# Patient Record
Sex: FEMALE | Race: WHITE | Employment: UNEMPLOYED | URBAN - NONMETROPOLITAN AREA
[De-identification: names, ages, dates, MRNs, and addresses within clinical notes are randomized per-mention and may not be internally consistent; named-entity substitution may affect disease eponyms.]

---

## 2021-06-10 PROBLEM — J69.0 ASPIRATION PNEUMONIA DUE TO REGURGITATED FOOD (HCC): Status: ACTIVE | Noted: 2021-06-10

## 2021-06-10 PROBLEM — J18.9 PNEUMONIA: Status: ACTIVE | Noted: 2021-06-10

## 2021-06-11 ENCOUNTER — HOSPITAL ENCOUNTER (INPATIENT)
Age: 36
LOS: 4 days | Discharge: HOME | DRG: 139 | End: 2021-06-15
Attending: INTERNAL MEDICINE
Payer: MEDICAID

## 2021-06-11 ENCOUNTER — APPOINTMENT (OUTPATIENT)
Dept: CT IMAGING | Age: 36
DRG: 139 | End: 2021-06-11
Attending: INTERNAL MEDICINE
Payer: MEDICAID

## 2021-06-11 ENCOUNTER — APPOINTMENT (OUTPATIENT)
Dept: GENERAL RADIOLOGY | Age: 36
DRG: 139 | End: 2021-06-11
Attending: INTERNAL MEDICINE
Payer: MEDICAID

## 2021-06-11 PROBLEM — E66.01 SEVERE OBESITY (BMI 35.0-35.9 WITH COMORBIDITY) (HCC): Status: ACTIVE | Noted: 2021-06-11

## 2021-06-11 PROBLEM — I50.32 CHRONIC HEART FAILURE WITH PRESERVED EJECTION FRACTION (HCC): Status: ACTIVE | Noted: 2021-06-11

## 2021-06-11 PROBLEM — R09.02 HYPOXEMIA: Status: ACTIVE | Noted: 2021-06-11

## 2021-06-11 PROBLEM — G93.2 IDIOPATHIC INTRACRANIAL HYPERTENSION: Status: ACTIVE | Noted: 2021-06-11

## 2021-06-11 PROBLEM — J96.00 ACUTE RESPIRATORY FAILURE (HCC): Status: ACTIVE | Noted: 2021-06-11

## 2021-06-11 PROBLEM — G40.909 SEIZURE DISORDER (HCC): Status: ACTIVE | Noted: 2021-06-11

## 2021-06-11 LAB
ALBUMIN SERPL-MCNC: 4.1 G/DL (ref 3.5–5.1)
ALLEN TEST: POSITIVE
ALP BLD-CCNC: 108 U/L (ref 38–126)
ALT SERPL-CCNC: 12 U/L (ref 11–66)
ANION GAP SERPL CALCULATED.3IONS-SCNC: 14 MEQ/L (ref 8–16)
AST SERPL-CCNC: 44 U/L (ref 5–40)
BASE EXCESS (CALCULATED): 2.8 MMOL/L (ref -2.5–2.5)
BASOPHILS # BLD: 0.2 %
BASOPHILS ABSOLUTE: 0.1 THOU/MM3 (ref 0–0.1)
BILIRUB SERPL-MCNC: 0.2 MG/DL (ref 0.3–1.2)
BUN BLDV-MCNC: 20 MG/DL (ref 7–22)
CALCIUM IONIZED: 1 MMOL/L (ref 1.12–1.32)
CALCIUM SERPL-MCNC: 9.9 MG/DL (ref 8.5–10.5)
CHLORIDE BLD-SCNC: 101 MEQ/L (ref 98–111)
CO2: 24 MEQ/L (ref 23–33)
COLLECTED BY:: ABNORMAL
CREAT SERPL-MCNC: 0.8 MG/DL (ref 0.4–1.2)
DEVICE: ABNORMAL
EOSINOPHIL # BLD: 1.2 %
EOSINOPHILS ABSOLUTE: 0.3 THOU/MM3 (ref 0–0.4)
ERYTHROCYTE [DISTWIDTH] IN BLOOD BY AUTOMATED COUNT: 13.2 % (ref 11.5–14.5)
ERYTHROCYTE [DISTWIDTH] IN BLOOD BY AUTOMATED COUNT: 48.1 FL (ref 35–45)
GFR SERPL CREATININE-BSD FRML MDRD: 81 ML/MIN/1.73M2
GLUCOSE BLD-MCNC: 99 MG/DL (ref 70–108)
HCO3: 30 MMOL/L (ref 23–28)
HCT VFR BLD CALC: 41.8 % (ref 37–47)
HEMOGLOBIN: 12.7 GM/DL (ref 12–16)
IFIO2: 100
IMMATURE GRANS (ABS): 0.16 THOU/MM3 (ref 0–0.07)
IMMATURE GRANULOCYTES: 0.6 %
LACTIC ACID: 0.6 MMOL/L (ref 0.5–2)
LYMPHOCYTES # BLD: 3.7 %
LYMPHOCYTES ABSOLUTE: 1 THOU/MM3 (ref 1–4.8)
MAGNESIUM: 2 MG/DL (ref 1.6–2.4)
MCH RBC QN AUTO: 30.1 PG (ref 26–33)
MCHC RBC AUTO-ENTMCNC: 30.4 GM/DL (ref 32.2–35.5)
MCV RBC AUTO: 99.1 FL (ref 81–99)
MODE: ABNORMAL
MONOCYTES # BLD: 1.4 %
MONOCYTES ABSOLUTE: 0.4 THOU/MM3 (ref 0.4–1.3)
NUCLEATED RED BLOOD CELLS: 0 /100 WBC
O2 SATURATION: 98 %
PCO2: 55 MMHG (ref 35–45)
PH BLOOD GAS: 7.34 (ref 7.35–7.45)
PHOSPHORUS: 3.3 MG/DL (ref 2.4–4.7)
PLATELET # BLD: 304 THOU/MM3 (ref 130–400)
PLATELET ESTIMATE: ADEQUATE
PMV BLD AUTO: 10.4 FL (ref 9.4–12.4)
PO2: 121 MMHG (ref 71–104)
POTASSIUM SERPL-SCNC: 4.6 MEQ/L (ref 3.5–5.2)
PRO-BNP: 264.5 PG/ML (ref 0–450)
PROCALCITONIN: 0.1 NG/ML (ref 0.01–0.09)
RBC # BLD: 4.22 MILL/MM3 (ref 4.2–5.4)
SCAN OF BLOOD SMEAR: NORMAL
SEG NEUTROPHILS: 92.9 %
SEGMENTED NEUTROPHILS ABSOLUTE COUNT: 24.1 THOU/MM3 (ref 1.8–7.7)
SET PEEP: 6 MMHG
SET PRESS SUPP: 6 CMH2O
SODIUM BLD-SCNC: 139 MEQ/L (ref 135–145)
SOURCE, BLOOD GAS: ABNORMAL
TOTAL PROTEIN: 7.5 G/DL (ref 6.1–8)
TROPONIN T: < 0.01 NG/ML
WBC # BLD: 25.9 THOU/MM3 (ref 4.8–10.8)

## 2021-06-11 PROCEDURE — 85025 COMPLETE CBC W/AUTO DIFF WBC: CPT

## 2021-06-11 PROCEDURE — 83880 ASSAY OF NATRIURETIC PEPTIDE: CPT

## 2021-06-11 PROCEDURE — 94761 N-INVAS EAR/PLS OXIMETRY MLT: CPT

## 2021-06-11 PROCEDURE — 3209999900 CT INTERPRETATION OF OUTSIDE IMAGES

## 2021-06-11 PROCEDURE — 2060000000 HC ICU INTERMEDIATE R&B

## 2021-06-11 PROCEDURE — 84100 ASSAY OF PHOSPHORUS: CPT

## 2021-06-11 PROCEDURE — 82803 BLOOD GASES ANY COMBINATION: CPT

## 2021-06-11 PROCEDURE — 83735 ASSAY OF MAGNESIUM: CPT

## 2021-06-11 PROCEDURE — 71045 X-RAY EXAM CHEST 1 VIEW: CPT

## 2021-06-11 PROCEDURE — 84145 PROCALCITONIN (PCT): CPT

## 2021-06-11 PROCEDURE — 94660 CPAP INITIATION&MGMT: CPT

## 2021-06-11 PROCEDURE — 80053 COMPREHEN METABOLIC PANEL: CPT

## 2021-06-11 PROCEDURE — 99223 1ST HOSP IP/OBS HIGH 75: CPT | Performed by: INTERNAL MEDICINE

## 2021-06-11 PROCEDURE — 2700000000 HC OXYGEN THERAPY PER DAY

## 2021-06-11 PROCEDURE — 82330 ASSAY OF CALCIUM: CPT

## 2021-06-11 PROCEDURE — 84484 ASSAY OF TROPONIN QUANT: CPT

## 2021-06-11 PROCEDURE — 83605 ASSAY OF LACTIC ACID: CPT

## 2021-06-11 PROCEDURE — 36415 COLL VENOUS BLD VENIPUNCTURE: CPT

## 2021-06-11 RX ORDER — ARIPIPRAZOLE 5 MG/1
5 TABLET ORAL DAILY
Status: DISCONTINUED | OUTPATIENT
Start: 2021-06-12 | End: 2021-06-15 | Stop reason: HOSPADM

## 2021-06-11 RX ORDER — METHYLPREDNISOLONE SODIUM SUCCINATE 125 MG/2ML
125 INJECTION, POWDER, LYOPHILIZED, FOR SOLUTION INTRAMUSCULAR; INTRAVENOUS EVERY 8 HOURS
Status: DISCONTINUED | OUTPATIENT
Start: 2021-06-11 | End: 2021-06-12

## 2021-06-11 RX ORDER — PROPRANOLOL HYDROCHLORIDE 20 MG/1
80 TABLET ORAL 2 TIMES DAILY
Status: DISCONTINUED | OUTPATIENT
Start: 2021-06-12 | End: 2021-06-12

## 2021-06-11 RX ORDER — SODIUM CHLORIDE 0.9 % (FLUSH) 0.9 %
5-40 SYRINGE (ML) INJECTION EVERY 12 HOURS SCHEDULED
Status: DISCONTINUED | OUTPATIENT
Start: 2021-06-12 | End: 2021-06-12 | Stop reason: SDUPTHER

## 2021-06-11 RX ORDER — LEVOFLOXACIN 5 MG/ML
750 INJECTION, SOLUTION INTRAVENOUS EVERY 24 HOURS
Status: DISCONTINUED | OUTPATIENT
Start: 2021-06-12 | End: 2021-06-12

## 2021-06-11 RX ORDER — SODIUM CHLORIDE 9 MG/ML
25 INJECTION, SOLUTION INTRAVENOUS PRN
Status: DISCONTINUED | OUTPATIENT
Start: 2021-06-11 | End: 2021-06-15 | Stop reason: HOSPADM

## 2021-06-11 RX ORDER — IPRATROPIUM BROMIDE AND ALBUTEROL SULFATE 2.5; .5 MG/3ML; MG/3ML
1 SOLUTION RESPIRATORY (INHALATION)
Status: DISCONTINUED | OUTPATIENT
Start: 2021-06-12 | End: 2021-06-12

## 2021-06-11 RX ORDER — DILTIAZEM HYDROCHLORIDE 120 MG/1
120 CAPSULE, COATED, EXTENDED RELEASE ORAL DAILY
Status: DISCONTINUED | OUTPATIENT
Start: 2021-06-12 | End: 2021-06-12

## 2021-06-11 RX ORDER — ONDANSETRON 2 MG/ML
4 INJECTION INTRAMUSCULAR; INTRAVENOUS EVERY 6 HOURS PRN
Status: DISCONTINUED | OUTPATIENT
Start: 2021-06-11 | End: 2021-06-15 | Stop reason: HOSPADM

## 2021-06-11 RX ORDER — SPIRONOLACTONE 25 MG/1
25 TABLET ORAL DAILY
Status: DISCONTINUED | OUTPATIENT
Start: 2021-06-12 | End: 2021-06-15 | Stop reason: HOSPADM

## 2021-06-11 RX ORDER — ACETAMINOPHEN 650 MG/1
650 SUPPOSITORY RECTAL EVERY 6 HOURS PRN
Status: DISCONTINUED | OUTPATIENT
Start: 2021-06-11 | End: 2021-06-15 | Stop reason: HOSPADM

## 2021-06-11 RX ORDER — GABAPENTIN 600 MG/1
1800 TABLET ORAL 2 TIMES DAILY
Status: DISCONTINUED | OUTPATIENT
Start: 2021-06-12 | End: 2021-06-12

## 2021-06-11 RX ORDER — ONDANSETRON 4 MG/1
4 TABLET, ORALLY DISINTEGRATING ORAL EVERY 8 HOURS PRN
Status: DISCONTINUED | OUTPATIENT
Start: 2021-06-11 | End: 2021-06-15 | Stop reason: HOSPADM

## 2021-06-11 RX ORDER — SODIUM CHLORIDE 0.9 % (FLUSH) 0.9 %
5-40 SYRINGE (ML) INJECTION PRN
Status: DISCONTINUED | OUTPATIENT
Start: 2021-06-11 | End: 2021-06-15 | Stop reason: HOSPADM

## 2021-06-11 RX ORDER — POLYETHYLENE GLYCOL 3350 17 G/17G
17 POWDER, FOR SOLUTION ORAL DAILY PRN
Status: DISCONTINUED | OUTPATIENT
Start: 2021-06-11 | End: 2021-06-15 | Stop reason: HOSPADM

## 2021-06-11 RX ORDER — VALSARTAN 320 MG/1
320 TABLET ORAL DAILY
Status: DISCONTINUED | OUTPATIENT
Start: 2021-06-12 | End: 2021-06-14

## 2021-06-11 RX ORDER — HYDROXYZINE PAMOATE 50 MG/1
50 CAPSULE ORAL 3 TIMES DAILY PRN
Status: DISCONTINUED | OUTPATIENT
Start: 2021-06-11 | End: 2021-06-12

## 2021-06-11 RX ORDER — BUPRENORPHINE AND NALOXONE 8; 2 MG/1; MG/1
1 FILM, SOLUBLE BUCCAL; SUBLINGUAL 2 TIMES DAILY
Status: DISCONTINUED | OUTPATIENT
Start: 2021-06-12 | End: 2021-06-15 | Stop reason: HOSPADM

## 2021-06-11 RX ORDER — VENLAFAXINE HYDROCHLORIDE 150 MG/1
150 CAPSULE, EXTENDED RELEASE ORAL EVERY MORNING
Status: DISCONTINUED | OUTPATIENT
Start: 2021-06-12 | End: 2021-06-12

## 2021-06-11 RX ORDER — FUROSEMIDE 20 MG/1
20 TABLET ORAL DAILY
COMMUNITY

## 2021-06-11 RX ORDER — ACETAMINOPHEN 325 MG/1
650 TABLET ORAL EVERY 6 HOURS PRN
Status: DISCONTINUED | OUTPATIENT
Start: 2021-06-11 | End: 2021-06-15 | Stop reason: HOSPADM

## 2021-06-11 RX ORDER — PANTOPRAZOLE SODIUM 40 MG/1
40 TABLET, DELAYED RELEASE ORAL 2 TIMES DAILY
Status: DISCONTINUED | OUTPATIENT
Start: 2021-06-12 | End: 2021-06-12

## 2021-06-11 RX ORDER — LAMOTRIGINE 25 MG/1
25 TABLET ORAL 2 TIMES DAILY
Status: DISCONTINUED | OUTPATIENT
Start: 2021-06-12 | End: 2021-06-12

## 2021-06-11 RX ORDER — ALBUTEROL SULFATE 90 UG/1
1 AEROSOL, METERED RESPIRATORY (INHALATION) EVERY 6 HOURS PRN
Status: DISCONTINUED | OUTPATIENT
Start: 2021-06-11 | End: 2021-06-15 | Stop reason: HOSPADM

## 2021-06-11 ASSESSMENT — PAIN DESCRIPTION - ORIENTATION
ORIENTATION_4: MID
ORIENTATION_3: MID
ORIENTATION_2: MID
ORIENTATION: MID

## 2021-06-11 ASSESSMENT — PAIN DESCRIPTION - PROGRESSION
CLINICAL_PROGRESSION: GRADUALLY WORSENING
CLINICAL_PROGRESSION_4: GRADUALLY WORSENING
CLINICAL_PROGRESSION_3: GRADUALLY WORSENING
CLINICAL_PROGRESSION_2: GRADUALLY WORSENING

## 2021-06-11 ASSESSMENT — PAIN DESCRIPTION - LOCATION
LOCATION_2: CHEST
LOCATION_4: HEAD
LOCATION_3: BACK
LOCATION: THROAT

## 2021-06-11 ASSESSMENT — PAIN DESCRIPTION - PAIN TYPE
TYPE_4: ACUTE PAIN
TYPE_3: ACUTE PAIN
TYPE_2: ACUTE PAIN
TYPE: ACUTE PAIN

## 2021-06-11 ASSESSMENT — PAIN DESCRIPTION - ONSET
ONSET_2: ON-GOING
ONSET: ON-GOING
ONSET_3: ON-GOING
ONSET_4: ON-GOING

## 2021-06-11 ASSESSMENT — PAIN DESCRIPTION - DESCRIPTORS
DESCRIPTORS_4: ACHING;DISCOMFORT
DESCRIPTORS_3: ACHING;DISCOMFORT
DESCRIPTORS: ACHING;DISCOMFORT
DESCRIPTORS_2: ACHING;DISCOMFORT

## 2021-06-11 ASSESSMENT — PAIN DESCRIPTION - FREQUENCY
FREQUENCY: CONTINUOUS
FREQUENCY_4: CONTINUOUS

## 2021-06-11 ASSESSMENT — PAIN DESCRIPTION - DURATION
DURATION_3: CONTINUOUS
DURATION_2: CONTINUOUS

## 2021-06-11 ASSESSMENT — PAIN DESCRIPTION - INTENSITY
RATING_2: 5
RATING_3: 5
RATING_4: 5

## 2021-06-11 ASSESSMENT — PAIN SCALES - GENERAL: PAINLEVEL_OUTOF10: 5

## 2021-06-11 ASSESSMENT — PAIN - FUNCTIONAL ASSESSMENT: PAIN_FUNCTIONAL_ASSESSMENT: PREVENTS OR INTERFERES SOME ACTIVE ACTIVITIES AND ADLS

## 2021-06-11 NOTE — PROGRESS NOTES
Transfer  Report from [facility]  Referring Physician   Accepting Physician  Dr. Antelmo Beach  Patient Condition:     83. Patient at [facility] patient of Dr. Eleni Barton, patient is inpatient currently. Came in originally for SOB, Anxiety, cough. Patient has a history of Heart Failure where she had ejection fracture of 55-60%. Now has worsening pneumonia that pulmonary there recommends higher level of care. Currently on high flow 02. WBC 33.0, HB 12.1, Drug Screen positive for Benzo, amphetamines, opiates, PCP. UA-, Trop-, Lactic 1.0. Echo done today- no results yet, CXR- Stable mild enlarged cardiac silhouette seen. CT Chest- post surgery changes from Right upper Lobe resection, Diffuse Bilateral Ground Glass Opacities, Mild smooth wobular septal thickening, probable reactive lymph nodes- ? multi lobe pneumonia, ARDS. Vitals B/P-113/73, T 97.4, HR 74, R 18, 93% on high flow.  Admit to Stepdown, Inpatient, Dx Acute Resp Distress

## 2021-06-12 ENCOUNTER — APPOINTMENT (OUTPATIENT)
Dept: GENERAL RADIOLOGY | Age: 36
DRG: 139 | End: 2021-06-12
Attending: INTERNAL MEDICINE
Payer: MEDICAID

## 2021-06-12 LAB
ALBUMIN SERPL-MCNC: 3.7 G/DL (ref 3.5–5.1)
ALP BLD-CCNC: 113 U/L (ref 38–126)
ALT SERPL-CCNC: 12 U/L (ref 11–66)
ANION GAP SERPL CALCULATED.3IONS-SCNC: 12 MEQ/L (ref 8–16)
AST SERPL-CCNC: 43 U/L (ref 5–40)
BASOPHILS # BLD: 0.2 %
BASOPHILS ABSOLUTE: 0 THOU/MM3 (ref 0–0.1)
BILIRUB SERPL-MCNC: 0.3 MG/DL (ref 0.3–1.2)
BUN BLDV-MCNC: 19 MG/DL (ref 7–22)
CALCIUM SERPL-MCNC: 10 MG/DL (ref 8.5–10.5)
CHLORIDE BLD-SCNC: 101 MEQ/L (ref 98–111)
CO2: 26 MEQ/L (ref 23–33)
CREAT SERPL-MCNC: 0.6 MG/DL (ref 0.4–1.2)
CYTOLOGY-NON GYN: NORMAL
EOSINOPHIL # BLD: 0 %
EOSINOPHILS ABSOLUTE: 0 THOU/MM3 (ref 0–0.4)
ERYTHROCYTE [DISTWIDTH] IN BLOOD BY AUTOMATED COUNT: 12.9 % (ref 11.5–14.5)
ERYTHROCYTE [DISTWIDTH] IN BLOOD BY AUTOMATED COUNT: 48.3 FL (ref 35–45)
GFR SERPL CREATININE-BSD FRML MDRD: > 90 ML/MIN/1.73M2
GLUCOSE BLD-MCNC: 135 MG/DL (ref 70–108)
GLUCOSE BLD-MCNC: 136 MG/DL (ref 70–108)
GLUCOSE BLD-MCNC: 159 MG/DL (ref 70–108)
HCT VFR BLD CALC: 42.7 % (ref 37–47)
HEMOGLOBIN: 12.6 GM/DL (ref 12–16)
IMMATURE GRANS (ABS): 0.14 THOU/MM3 (ref 0–0.07)
IMMATURE GRANULOCYTES: 0.7 %
LYMPHOCYTES # BLD: 2.8 %
LYMPHOCYTES ABSOLUTE: 0.5 THOU/MM3 (ref 1–4.8)
MCH RBC QN AUTO: 30.1 PG (ref 26–33)
MCHC RBC AUTO-ENTMCNC: 29.5 GM/DL (ref 32.2–35.5)
MCV RBC AUTO: 101.9 FL (ref 81–99)
MONOCYTES # BLD: 0.6 %
MONOCYTES ABSOLUTE: 0.1 THOU/MM3 (ref 0.4–1.3)
MRSA SCREEN RT-PCR: NEGATIVE
NUCLEATED RED BLOOD CELLS: 0 /100 WBC
PLATELET # BLD: 330 THOU/MM3 (ref 130–400)
PMV BLD AUTO: 10.3 FL (ref 9.4–12.4)
POTASSIUM REFLEX MAGNESIUM: 4.8 MEQ/L (ref 3.5–5.2)
RBC # BLD: 4.19 MILL/MM3 (ref 4.2–5.4)
RHEUMATOID FACTOR: 21 IU/ML (ref 0–13)
SEG NEUTROPHILS: 95.7 %
SEGMENTED NEUTROPHILS ABSOLUTE COUNT: 18.6 THOU/MM3 (ref 1.8–7.7)
SODIUM BLD-SCNC: 139 MEQ/L (ref 135–145)
TOTAL PROTEIN: 7.4 G/DL (ref 6.1–8)
VANCOMYCIN RESISTANT ENTEROCOCCUS: NEGATIVE
WBC # BLD: 19.4 THOU/MM3 (ref 4.8–10.8)

## 2021-06-12 PROCEDURE — 86769 SARS-COV-2 COVID-19 ANTIBODY: CPT

## 2021-06-12 PROCEDURE — 2720000010 HC SURG SUPPLY STERILE

## 2021-06-12 PROCEDURE — 2580000003 HC RX 258: Performed by: INTERNAL MEDICINE

## 2021-06-12 PROCEDURE — 87255 GENET VIRUS ISOLATE HSV: CPT

## 2021-06-12 PROCEDURE — 6370000000 HC RX 637 (ALT 250 FOR IP): Performed by: INTERNAL MEDICINE

## 2021-06-12 PROCEDURE — 31624 DX BRONCHOSCOPE/LAVAGE: CPT | Performed by: INTERNAL MEDICINE

## 2021-06-12 PROCEDURE — 86255 FLUORESCENT ANTIBODY SCREEN: CPT

## 2021-06-12 PROCEDURE — 94660 CPAP INITIATION&MGMT: CPT

## 2021-06-12 PROCEDURE — 2700000000 HC OXYGEN THERAPY PER DAY

## 2021-06-12 PROCEDURE — 87106 FUNGI IDENTIFICATION YEAST: CPT

## 2021-06-12 PROCEDURE — 88312 SPECIAL STAINS GROUP 1: CPT

## 2021-06-12 PROCEDURE — 6370000000 HC RX 637 (ALT 250 FOR IP): Performed by: STUDENT IN AN ORGANIZED HEALTH CARE EDUCATION/TRAINING PROGRAM

## 2021-06-12 PROCEDURE — 86225 DNA ANTIBODY NATIVE: CPT

## 2021-06-12 PROCEDURE — 36415 COLL VENOUS BLD VENIPUNCTURE: CPT

## 2021-06-12 PROCEDURE — 3609027000 HC BRONCHOSCOPY

## 2021-06-12 PROCEDURE — 86200 CCP ANTIBODY: CPT

## 2021-06-12 PROCEDURE — 87641 MR-STAPH DNA AMP PROBE: CPT

## 2021-06-12 PROCEDURE — C9113 INJ PANTOPRAZOLE SODIUM, VIA: HCPCS | Performed by: STUDENT IN AN ORGANIZED HEALTH CARE EDUCATION/TRAINING PROGRAM

## 2021-06-12 PROCEDURE — 0BJ08ZZ INSPECTION OF TRACHEOBRONCHIAL TREE, VIA NATURAL OR ARTIFICIAL OPENING ENDOSCOPIC: ICD-10-PCS | Performed by: INTERNAL MEDICINE

## 2021-06-12 PROCEDURE — 88108 CYTOPATH CONCENTRATE TECH: CPT

## 2021-06-12 PROCEDURE — 87449 NOS EACH ORGANISM AG IA: CPT

## 2021-06-12 PROCEDURE — 5A1945Z RESPIRATORY VENTILATION, 24-96 CONSECUTIVE HOURS: ICD-10-PCS | Performed by: INTERNAL MEDICINE

## 2021-06-12 PROCEDURE — 94002 VENT MGMT INPAT INIT DAY: CPT

## 2021-06-12 PROCEDURE — 71045 X-RAY EXAM CHEST 1 VIEW: CPT

## 2021-06-12 PROCEDURE — 94761 N-INVAS EAR/PLS OXIMETRY MLT: CPT

## 2021-06-12 PROCEDURE — 87102 FUNGUS ISOLATION CULTURE: CPT

## 2021-06-12 PROCEDURE — 0BH18EZ INSERTION OF ENDOTRACHEAL AIRWAY INTO TRACHEA, VIA NATURAL OR ARTIFICIAL OPENING ENDOSCOPIC: ICD-10-PCS | Performed by: INTERNAL MEDICINE

## 2021-06-12 PROCEDURE — 87086 URINE CULTURE/COLONY COUNT: CPT

## 2021-06-12 PROCEDURE — 2500000003 HC RX 250 WO HCPCS: Performed by: FAMILY MEDICINE

## 2021-06-12 PROCEDURE — 85025 COMPLETE CBC W/AUTO DIFF WBC: CPT

## 2021-06-12 PROCEDURE — 94640 AIRWAY INHALATION TREATMENT: CPT

## 2021-06-12 PROCEDURE — 6360000002 HC RX W HCPCS: Performed by: FAMILY MEDICINE

## 2021-06-12 PROCEDURE — 31500 INSERT EMERGENCY AIRWAY: CPT

## 2021-06-12 PROCEDURE — 87500 VANOMYCIN DNA AMP PROBE: CPT

## 2021-06-12 PROCEDURE — 86038 ANTINUCLEAR ANTIBODIES: CPT

## 2021-06-12 PROCEDURE — 6360000002 HC RX W HCPCS: Performed by: STUDENT IN AN ORGANIZED HEALTH CARE EDUCATION/TRAINING PROGRAM

## 2021-06-12 PROCEDURE — 6360000002 HC RX W HCPCS: Performed by: INTERNAL MEDICINE

## 2021-06-12 PROCEDURE — 31500 INSERT EMERGENCY AIRWAY: CPT | Performed by: FAMILY MEDICINE

## 2021-06-12 PROCEDURE — 87070 CULTURE OTHR SPECIMN AEROBIC: CPT

## 2021-06-12 PROCEDURE — 82948 REAGENT STRIP/BLOOD GLUCOSE: CPT

## 2021-06-12 PROCEDURE — 99999 PR OFFICE/OUTPT VISIT,PROCEDURE ONLY: CPT | Performed by: FAMILY MEDICINE

## 2021-06-12 PROCEDURE — 87205 SMEAR GRAM STAIN: CPT

## 2021-06-12 PROCEDURE — 88112 CYTOPATH CELL ENHANCE TECH: CPT

## 2021-06-12 PROCEDURE — 87081 CULTURE SCREEN ONLY: CPT

## 2021-06-12 PROCEDURE — 2000000000 HC ICU R&B

## 2021-06-12 PROCEDURE — 86430 RHEUMATOID FACTOR TEST QUAL: CPT

## 2021-06-12 PROCEDURE — 87899 AGENT NOS ASSAY W/OPTIC: CPT

## 2021-06-12 PROCEDURE — 88305 TISSUE EXAM BY PATHOLOGIST: CPT

## 2021-06-12 PROCEDURE — 89051 BODY FLUID CELL COUNT: CPT

## 2021-06-12 PROCEDURE — 6360000002 HC RX W HCPCS

## 2021-06-12 PROCEDURE — 2580000003 HC RX 258: Performed by: STUDENT IN AN ORGANIZED HEALTH CARE EDUCATION/TRAINING PROGRAM

## 2021-06-12 PROCEDURE — 80053 COMPREHEN METABOLIC PANEL: CPT

## 2021-06-12 PROCEDURE — 86235 NUCLEAR ANTIGEN ANTIBODY: CPT

## 2021-06-12 RX ORDER — ETOMIDATE 2 MG/ML
20 INJECTION INTRAVENOUS ONCE
Status: DISCONTINUED | OUTPATIENT
Start: 2021-06-12 | End: 2021-06-12

## 2021-06-12 RX ORDER — PROPRANOLOL HYDROCHLORIDE 20 MG/1
60 TABLET ORAL 3 TIMES DAILY
Status: DISCONTINUED | OUTPATIENT
Start: 2021-06-12 | End: 2021-06-15 | Stop reason: HOSPADM

## 2021-06-12 RX ORDER — FENTANYL CITRATE 50 UG/ML
25 INJECTION, SOLUTION INTRAMUSCULAR; INTRAVENOUS
Status: DISCONTINUED | OUTPATIENT
Start: 2021-06-12 | End: 2021-06-12

## 2021-06-12 RX ORDER — LAMOTRIGINE 25 MG/1
50 TABLET ORAL 2 TIMES DAILY
Status: DISCONTINUED | OUTPATIENT
Start: 2021-06-12 | End: 2021-06-15 | Stop reason: HOSPADM

## 2021-06-12 RX ORDER — NICOTINE 21 MG/24HR
1 PATCH, TRANSDERMAL 24 HOURS TRANSDERMAL DAILY
Status: DISCONTINUED | OUTPATIENT
Start: 2021-06-13 | End: 2021-06-14

## 2021-06-12 RX ORDER — PROPOFOL 10 MG/ML
INJECTION, EMULSION INTRAVENOUS
Status: COMPLETED | OUTPATIENT
Start: 2021-06-12 | End: 2021-06-12

## 2021-06-12 RX ORDER — MIDAZOLAM HYDROCHLORIDE 1 MG/ML
INJECTION INTRAMUSCULAR; INTRAVENOUS
Status: DISPENSED
Start: 2021-06-12 | End: 2021-06-12

## 2021-06-12 RX ORDER — PANTOPRAZOLE SODIUM 40 MG/10ML
40 INJECTION, POWDER, LYOPHILIZED, FOR SOLUTION INTRAVENOUS DAILY
Status: DISCONTINUED | OUTPATIENT
Start: 2021-06-12 | End: 2021-06-15 | Stop reason: HOSPADM

## 2021-06-12 RX ORDER — SODIUM CHLORIDE 0.9 % (FLUSH) 0.9 %
5-40 SYRINGE (ML) INJECTION EVERY 12 HOURS SCHEDULED
Status: DISCONTINUED | OUTPATIENT
Start: 2021-06-12 | End: 2021-06-15 | Stop reason: HOSPADM

## 2021-06-12 RX ORDER — ROCURONIUM BROMIDE 10 MG/ML
INJECTION, SOLUTION INTRAVENOUS
Status: COMPLETED | OUTPATIENT
Start: 2021-06-12 | End: 2021-06-12

## 2021-06-12 RX ORDER — METHYLPREDNISOLONE SODIUM SUCCINATE 40 MG/ML
40 INJECTION, POWDER, LYOPHILIZED, FOR SOLUTION INTRAMUSCULAR; INTRAVENOUS DAILY
Status: DISCONTINUED | OUTPATIENT
Start: 2021-06-12 | End: 2021-06-15 | Stop reason: HOSPADM

## 2021-06-12 RX ORDER — MIDAZOLAM HYDROCHLORIDE 1 MG/ML
INJECTION INTRAMUSCULAR; INTRAVENOUS
Status: COMPLETED | OUTPATIENT
Start: 2021-06-12 | End: 2021-06-12

## 2021-06-12 RX ORDER — PROPOFOL 10 MG/ML
INJECTION, EMULSION INTRAVENOUS
Status: COMPLETED
Start: 2021-06-12 | End: 2021-06-12

## 2021-06-12 RX ORDER — ETOMIDATE 2 MG/ML
INJECTION INTRAVENOUS
Status: COMPLETED | OUTPATIENT
Start: 2021-06-12 | End: 2021-06-12

## 2021-06-12 RX ORDER — VENLAFAXINE 50 MG/1
50 TABLET ORAL 3 TIMES DAILY
Status: DISCONTINUED | OUTPATIENT
Start: 2021-06-12 | End: 2021-06-15

## 2021-06-12 RX ORDER — ETOMIDATE 2 MG/ML
INJECTION INTRAVENOUS
Status: DISPENSED
Start: 2021-06-12 | End: 2021-06-12

## 2021-06-12 RX ORDER — LEVOFLOXACIN 5 MG/ML
750 INJECTION, SOLUTION INTRAVENOUS EVERY 24 HOURS
Status: DISCONTINUED | OUTPATIENT
Start: 2021-06-13 | End: 2021-06-14

## 2021-06-12 RX ORDER — FENTANYL CITRATE 50 UG/ML
50 INJECTION, SOLUTION INTRAMUSCULAR; INTRAVENOUS
Status: DISCONTINUED | OUTPATIENT
Start: 2021-06-12 | End: 2021-06-15

## 2021-06-12 RX ORDER — PROPOFOL 10 MG/ML
5-50 INJECTION, EMULSION INTRAVENOUS
Status: DISCONTINUED | OUTPATIENT
Start: 2021-06-12 | End: 2021-06-13

## 2021-06-12 RX ORDER — BUPRENORPHINE AND NALOXONE 8; 2 MG/1; MG/1
1 FILM, SOLUBLE BUCCAL; SUBLINGUAL ONCE
Status: COMPLETED | OUTPATIENT
Start: 2021-06-12 | End: 2021-06-12

## 2021-06-12 RX ORDER — GABAPENTIN 600 MG/1
300 TABLET ORAL 3 TIMES DAILY
Status: DISCONTINUED | OUTPATIENT
Start: 2021-06-12 | End: 2021-06-14

## 2021-06-12 RX ORDER — PROPOFOL 10 MG/ML
INJECTION, EMULSION INTRAVENOUS CONTINUOUS PRN
Status: COMPLETED | OUTPATIENT
Start: 2021-06-12 | End: 2021-06-12

## 2021-06-12 RX ADMIN — CEFEPIME HYDROCHLORIDE 2000 MG: 2 INJECTION, POWDER, FOR SOLUTION INTRAVENOUS at 02:16

## 2021-06-12 RX ADMIN — MIDAZOLAM 2 MG: 1 INJECTION INTRAMUSCULAR; INTRAVENOUS at 06:27

## 2021-06-12 RX ADMIN — PROPOFOL 40 MG: 10 INJECTION, EMULSION INTRAVENOUS at 06:41

## 2021-06-12 RX ADMIN — LEVOFLOXACIN 750 MG: 5 INJECTION, SOLUTION INTRAVENOUS at 03:25

## 2021-06-12 RX ADMIN — FENTANYL CITRATE 50 MCG: 50 INJECTION, SOLUTION INTRAMUSCULAR; INTRAVENOUS at 22:27

## 2021-06-12 RX ADMIN — METHYLPREDNISOLONE SODIUM SUCCINATE 40 MG: 40 INJECTION, POWDER, FOR SOLUTION INTRAMUSCULAR; INTRAVENOUS at 11:52

## 2021-06-12 RX ADMIN — ETOMIDATE 20 MG: 2 INJECTION INTRAVENOUS at 06:21

## 2021-06-12 RX ADMIN — PROPOFOL 50 MCG/KG/MIN: 10 INJECTION, EMULSION INTRAVENOUS at 17:19

## 2021-06-12 RX ADMIN — PROPRANOLOL HYDROCHLORIDE 60 MG: 20 TABLET ORAL at 14:08

## 2021-06-12 RX ADMIN — IPRATROPIUM BROMIDE AND ALBUTEROL SULFATE 1 AMPULE: .5; 3 SOLUTION RESPIRATORY (INHALATION) at 00:39

## 2021-06-12 RX ADMIN — PROPRANOLOL HYDROCHLORIDE 60 MG: 20 TABLET ORAL at 20:37

## 2021-06-12 RX ADMIN — ENOXAPARIN SODIUM 40 MG: 40 INJECTION SUBCUTANEOUS at 09:30

## 2021-06-12 RX ADMIN — CEFEPIME HYDROCHLORIDE 2000 MG: 2 INJECTION, POWDER, FOR SOLUTION INTRAVENOUS at 17:19

## 2021-06-12 RX ADMIN — LAMOTRIGINE 50 MG: 25 TABLET ORAL at 20:37

## 2021-06-12 RX ADMIN — PROPOFOL 20 MG: 10 INJECTION, EMULSION INTRAVENOUS at 06:24

## 2021-06-12 RX ADMIN — BUPRENORPHINE AND NALOXONE 1 FILM: 8; 2 FILM BUCCAL; SUBLINGUAL at 05:13

## 2021-06-12 RX ADMIN — PROPOFOL 20 MG: 10 INJECTION, EMULSION INTRAVENOUS at 06:35

## 2021-06-12 RX ADMIN — PROPOFOL 10 MCG/KG/MIN: 10 INJECTION, EMULSION INTRAVENOUS at 06:23

## 2021-06-12 RX ADMIN — PANTOPRAZOLE SODIUM 40 MG: 40 INJECTION, POWDER, FOR SOLUTION INTRAVENOUS at 09:32

## 2021-06-12 RX ADMIN — ROCURONIUM BROMIDE 10 MG: 10 INJECTION, SOLUTION INTRAVENOUS at 06:36

## 2021-06-12 RX ADMIN — CEFEPIME HYDROCHLORIDE 2000 MG: 2 INJECTION, POWDER, FOR SOLUTION INTRAVENOUS at 08:48

## 2021-06-12 RX ADMIN — SODIUM CHLORIDE, PRESERVATIVE FREE 10 ML: 5 INJECTION INTRAVENOUS at 08:38

## 2021-06-12 RX ADMIN — PROPOFOL 20 MG: 10 INJECTION, EMULSION INTRAVENOUS at 06:23

## 2021-06-12 RX ADMIN — VENLAFAXINE 50 MG: 50 TABLET ORAL at 14:07

## 2021-06-12 RX ADMIN — CALCIUM GLUCONATE 2000 MG: 98 INJECTION, SOLUTION INTRAVENOUS at 09:30

## 2021-06-12 RX ADMIN — Medication 1250 MG: at 05:07

## 2021-06-12 RX ADMIN — LAMOTRIGINE 50 MG: 25 TABLET ORAL at 09:42

## 2021-06-12 RX ADMIN — Medication 1250 MG: at 11:55

## 2021-06-12 RX ADMIN — ARIPIPRAZOLE 5 MG: 5 TABLET ORAL at 09:42

## 2021-06-12 RX ADMIN — VENLAFAXINE 50 MG: 50 TABLET ORAL at 09:47

## 2021-06-12 RX ADMIN — METHYLPREDNISOLONE SODIUM SUCCINATE 125 MG: 125 INJECTION, POWDER, FOR SOLUTION INTRAMUSCULAR; INTRAVENOUS at 02:16

## 2021-06-12 RX ADMIN — ETOMIDATE 10 MG: 2 INJECTION INTRAVENOUS at 06:32

## 2021-06-12 RX ADMIN — PROPOFOL 50 MCG/KG/MIN: 10 INJECTION, EMULSION INTRAVENOUS at 21:22

## 2021-06-12 RX ADMIN — PROPOFOL 20 MG: 10 INJECTION, EMULSION INTRAVENOUS at 06:32

## 2021-06-12 RX ADMIN — PROPOFOL 50 MCG/KG/MIN: 10 INJECTION, EMULSION INTRAVENOUS at 13:55

## 2021-06-12 RX ADMIN — DILTIAZEM HYDROCHLORIDE 30 MG: 30 TABLET, FILM COATED ORAL at 17:23

## 2021-06-12 RX ADMIN — PROPOFOL 20 MG: 10 INJECTION, EMULSION INTRAVENOUS at 06:29

## 2021-06-12 RX ADMIN — PROPOFOL 50 MCG/KG/MIN: 10 INJECTION, EMULSION INTRAVENOUS at 09:20

## 2021-06-12 RX ADMIN — VENLAFAXINE 50 MG: 50 TABLET ORAL at 20:37

## 2021-06-12 ASSESSMENT — ENCOUNTER SYMPTOMS
NAUSEA: 0
PHOTOPHOBIA: 0
SHORTNESS OF BREATH: 1
SINUS PRESSURE: 0
VOMITING: 0
WHEEZING: 1
CHOKING: 0
BACK PAIN: 0
ABDOMINAL PAIN: 0
COUGH: 1
CHEST TIGHTNESS: 0
APNEA: 0
DIARRHEA: 0
SORE THROAT: 0

## 2021-06-12 ASSESSMENT — PULMONARY FUNCTION TESTS
PIF_VALUE: 29
PIF_VALUE: 26
PIF_VALUE: 29
PIF_VALUE: 29
PIF_VALUE: 30

## 2021-06-12 NOTE — PROGRESS NOTES
The patient is 79-year-old female with past medical history of organizing pneumonia in 2018 that was confirmed with lung biopsy, pulmonary hypertension and following in [facility]. She presented with echo respiratory failure with hypoxia, CT scan of the chest showed significant bilateral groundglass appearance. It appears to be acute as the patient had previous CT scan in May that showed hazy appearance but no impressive groundglass according to the report. Patient had organizing pneumonia 2018 and was treated with steroids with good response. She has pulmonary hypertension following in [facility], however did not see right heart cath done. She had left heart cath which was unimpressive. She had pulmonary function test that showed low residual volume and DLCO with no significant obstruction. The patient is known to be smoker and she abuses drugs. No clear history if she inhaled any drugs prior to this episodes but the drug screen is positive for benzo, opiates and PCP. Patient had history of joint swelling and she was seen by rheumatology and had rheumatology work-up which was negative. Differential diagnoses include infectious and noninfectious which is most likely. Noninfectious including acute eosinophilic pneumonia, RB ILD, CTD ILD, inhalation injury, and less likely organizing pneumonia. We will proceed with bronchoscopy with BAL. Getting cryobiopsy will be beneficial however it is not safe with pulmonary hypertension. The patient received steroids that may alter the BAL results. Doing rheumatology work-up. She is on antibiotics coverage, follow-up cultures. Sent for bacterial and fungal culture including PCP. HIV is negative. Will treat with steroids for now. Will do serial chest x-ray to monitor the response of the infiltrates on the treatment.

## 2021-06-12 NOTE — PROGRESS NOTES
Order was in the chart for bronchoscopy procedure. Verified that consent was signed. Time-out was done. ICU disposable  mobile scope  was used. Assisted Dr. Chandana Puckett with bronchoscopy procedure. Bronchial washings were obtained. Patient tolerated the procedure well. The bronchoscope was disposed of in a red bag and placed in dirty utility room.

## 2021-06-12 NOTE — PROGRESS NOTES
Pt admitted to  [facility] via cart/stretcher. Complaints: Chest pain / discomfort. IV none infusing into the antecubital right, condition patent and no redness. IV site free of s/s of infection or infiltration. Vital signs obtained. Assessment and data collection initiated. Two nurse skin assessment performed by [facility] RN and Sidney Herrera. Oriented to room. Policies and procedures for  explained. All questions answered with no further questions at this time. Fall prevention and safety brochure discussed with patient. Bed alarm on. Call light in reach.

## 2021-06-12 NOTE — PLAN OF CARE
Problem: Impaired respiratory status  Goal: Normal spontaneous ventilation  Outcome: Ongoing   Vent setting optimized to achieve target tidal volume, respiratory rate and ideal oxygen saturations. SBT will be performed when appropriate. Patient Weaning Progress    The patient's vent settings was able to be weaned this shift. Ventilator settings that were weaned              [] Mode   [] Pressure support weaned   [x] Fio2 weaned   [] Peep weaned             Spontaneous weaning trial  was not attempted. Evac tube was  hooked up with continuous low suction(20-30mmHg)      Cuff was not  deflated to determine cuff leak.  Pt intubated at 0630 today  *Specific details of weaning located in Ventilator documentation flowsheets*

## 2021-06-12 NOTE — PLAN OF CARE
Problem: Pain:  Goal: Pain level will decrease  Description: Pain level will decrease  Outcome: Ongoing  Note: Pain rated per CPOT and per patient indicators, free from signs of pain at this time. Continue to monitor. Goal: Control of acute pain  Description: Control of acute pain  Outcome: Ongoing  Note: No signs of pain at this time. Goal: Control of chronic pain  Description: Control of chronic pain  Outcome: Ongoing  Note: No signs of pain at this time. Problem: Non-Violent Restraints  Goal: Removal from restraints as soon as assessed to be safe  Outcome: Ongoing  Note: Remains in bilateral soft wrist restraints, continues to reach for ETT and lines. Goal: No harm/injury to patient while restraints in use  Outcome: Ongoing  Note: No harm noted to patient. Goal: Patient's dignity will be maintained  Outcome: Ongoing  Note: Dignity of patient maintained. Problem: Nutrition  Goal: Optimal nutrition therapy  6/12/2021 1537 by Evans Wolf RN  Outcome: Ongoing  Note: Tube feed initiated - monitor for tolerance. 6/12/2021 0959 by Verna La RD, LD  Outcome: Ongoing     Problem: Impaired respiratory status  Goal: Normal spontaneous ventilation  6/12/2021 1018 by Mike Olguin RCP  Outcome: Ongoing     Problem: Falls - Risk of:  Goal: Will remain free from falls  Description: Will remain free from falls  Outcome: Ongoing  Note: Remains free from falls. Remains in bed this shift - fall precautions in place. Goal: Absence of physical injury  Description: Absence of physical injury  Outcome: Ongoing  Note: Free from injury     Problem: Skin Integrity:  Goal: Will show no infection signs and symptoms  Description: Will show no infection signs and symptoms  Outcome: Ongoing  Note: Remains on antibiotics.   Goal: Absence of new skin breakdown  Description: Absence of new skin breakdown  Outcome: Ongoing  Note: No new skin breakdown noted, patient turned and repositioned q2h and prn     Care plan

## 2021-06-12 NOTE — PROGRESS NOTES
Pharmacy Note  Vancomycin Consult    Christ Dangelo is a 38 y.o. female started on Vancomycin for CAP; consult received from Dr. Sam Serrano to manage therapy. Also receiving the following antibiotics: Levaquin, cefepime. Patient Active Problem List   Diagnosis    Aspiration pneumonia due to regurgitated food ([facility]    Pneumonia    Idiopathic intracranial hypertension    Seizure disorder ([facility]    Severe obesity (BMI 35.0-35.9 with comorbidity) ([facility]    Hypoxemia    Chronic heart failure with preserved ejection fraction (HCC)    Acute respiratory failure (HCC)     Allergies:  Chlorhexidine gluconate     Temp max: 99    Recent Labs     06/11/21  0453 06/11/21  0453 06/11/21  2239 06/11/21  2240   BUN  --  16 20  --    CREATININE  --  0.71 0.8  --    WBC 30.1*  --   --  25.9*     No intake or output data in the 24 hours ending 06/11/21 2355    Culture Date Source Results   6/12/21 Blood          Ht Readings from Last 1 Encounters:   06/11/21 5' (1.524 m)        Wt Readings from Last 1 Encounters:   06/11/21 181 lb 8 oz (82.3 kg)       Body mass index is 35.45 kg/m². Estimated Creatinine Clearance: 92 mL/min (based on SCr of 0.8 mg/dL). Goal Trough Level: 15-20 mcg/mL    Assessment/Plan:  Will initiate Vancomycin 1250 mg IV every 8 hours. Timing of trough level will be determined based on culture results, renal function, and clinical response. Thank you for the consult. Will continue to follow.     Flaquita Crum [facility], BCPS, BCGP  6/11/2021     11:57 PM

## 2021-06-12 NOTE — PROCEDURES
Bronchoscopy Inpatient Procedure Note    Date of Procedure: 6/12/2021    Pre-op Diagnosis: Acute respiratory failure with hypoxia. Post-op Diagnosis: Acute respiratory failure with hypoxia. Bronchoscopist:   Lashell Altman MD     Procedure: Flexible fiberoptic bronchoscopy    Estimated Blood Loss: Minimal    Complications: None    Indications and History  See H&P and progress note. (Please see today's progress notes for the latest issues,  physical exam and lab data)    Consent to Procedure  The risks, benefits, complications, treatment options and expected outcomes were discussed with the patient and/or POA  The possibilities of reaction to medication, pulmonary aspiration, perforation of a viscus, bleeding, failure to diagnose a condition and creating a complication requiring transfusion or operation were discussed with the patient who freely signed the consent. Description of Procedure  The patient was intubated on mechanical ventilation and placed on 100% oxygen. Christ was monitored by the Critical Nursing and Respiratory therapy staff and the standard ICU monitoring devices. Christ Tanner and the procedure were verified as Flexible Fiberoptic Bronchoscopy. A Time Out was held and the above information confirmed. The patient was placed in the appropriate position. The patient was sedated using propofol drip. The bronchoscope was then passed into the trachea via the ET tube. After careful inspection of the tracheal, the bronchoscope was sequentially passed into all segments of the left and right endobronchial trees to the second and/or third divisions. Endobronchial findings    Lower part of trachea: No tracheal stenosis. Hanh  Normal mucosa and Sharp    Right Main Stem Bronchus  Normal mucosa and Sharp  Right Upper Lobe Bronchi Normal mucosa and no mucopurulent secretions. Right Middle Lobe Bronchi  Normal mucosa and no mucopurulent secretions.   Right Lower Lobe Bronchi (including the Superior segment)  Normal mucosa and no mucopurulent secretions. Left Main Stem Bronchus Normal mucosa and no mucopurulent secretions. Left Upper Lobe Bronchus, Upper Division Normal mucosa, no mucopurulent secretions. .  Left Upper Lobe Bronchus, Lingula  Normal mucosa, no mucopurulent secretions. .  Left Lower Lobe Bronchus (including the Superior segment)  Normal mucosa, no mucopurulent secretions. Isidoro Echeverriah Specimens Taken      BAL -   Location - [facility]. No mucopurulent secretions or bloody secretions.

## 2021-06-12 NOTE — H&P
History & Physical        Patient:  Rachael Whitfield  YOB: 1985    MRN: [id number]     Acct: [de-identified]    PCP: Fareed Merino MD    Date of Admission: 6/11/2021    Date of Service: Pt seen/examined on 06/11/21  and Admitted to Inpatient with expected LOS greater than two midnights due to medical therapy. Chief Complaint:      Multilobar pneumonia    History Of Present Illness:      38 y.o. female who presented to [address] [place] [address] with hx of wedge biopsy in 2018 after suffering cryptogenic organizing pneumonia. She initially presented at [facility] with elevated white blood cell count of 33,000 as well as groundglass opacities on June 8, 2021. She was started on ceftriaxone and Rocephin at [place]  as well as steroids with minimal improvement. It was reported that at home, she has continued to chain smoke despite being advised otherwise by her pulmonologist. She endorse hx of intravenous drug abuse but quit 13 years ago. Per OSH report, she has poor medication/medical compliance and report that she requested to be transferred to [facility] where she has been in the last two days. Today, patient was recommended transfer to [address] [place] [address] after showing poor clinical progress. Records shows that patient remained hypoxic with oxygen dropping to 70s on attempt to wean off oxygen. Patient CT chest showed cryptogenic organizing pneumonia and has been on three antibiotics including clindamycin, cefepime and Levaquin. Records also indicate patient has Diastolic CHF with moderate tricuspid regurgitation with elevated R- ventricular systolic pressure of 38PZPT suggestive of PH. Recently, patient has been following with Pulmonary service and reports being told that she may have severe acid reflux that is leading to ILD. She tells me at bedside that she does use 1-2 L oxygen at home as needed.    The patient has a history of idiopathic intracranial hypertension underwent lumbar puncture on  with an opening pressure of 26 mm 10 mL of cerebrospinal fluid was reviewed patient had an EEG which was concerning for toxic metabolic related cerebral dysfunction. EEG showed 7 to 8 Hz waveforms with mild generalized show slowing without any elective form features. The patient is on Lamictal for seizures. Patient arrived at bedside with high flow oxygen at 100% and 40L/Min to keep stats at normal range. She had an ABG on BIPAP 100% 14/6 showing 7.33/55/125. she denies continued cough, fever, chills or confusion. She continue to sustain hypoxia on removing oxygen with sats dropping to 69%     Past Medical History:          Diagnosis Date    Anxiety     Pneumonia     Pseudotumor  Cryptogenic pneumonia  ILD  Seizure do  Morbid obesity  Idiopathic intracranial HTN  Chronic CHF with preserved EF          Past Surgical History:          Procedure Laterality Date    BRONCHOSCOPY      CARDIAC CATHETERIZATION Left      SECTION      THORACOSCOPY      right chest     UPPER GASTROINTESTINAL ENDOSCOPY         Medications Prior to Admission:      Prior to Admission medications    Medication Sig Start Date End Date Taking?  Authorizing Provider   furosemide (LASIX) 20 MG tablet Take 20 mg by mouth daily   Yes Historical Provider, MD   hydrOXYzine (VISTARIL) 25 MG capsule Take 25 mg by mouth 3 times daily as needed for Itching    Historical Provider, MD   pantoprazole (PROTONIX) 40 MG tablet Take 40 mg by mouth 2 times daily    Historical Provider, MD   meclizine (ANTIVERT) 12.5 MG tablet Take 12.5 mg by mouth 3 times daily as needed    Historical Provider, MD   ARIPiprazole (ABILIFY) 5 MG tablet Take 5 mg by mouth daily    Historical Provider, MD   venlafaxine (EFFEXOR XR) 150 MG extended release capsule Take 150 mg by mouth every morning 2 tablets    Historical Provider, MD   lamoTRIgine (LAMICTAL) 25 MG tablet Take 50 mg by mouth 2 times daily 2 tablets BID    Historical Provider, MD   spironolactone (ALDACTONE) 25 MG tablet 1 tablet daily 5/4/21   Historical Provider, MD   dilTIAZem (CARDIZEM CD) 120 MG extended release capsule Take 1 capsule by mouth daily 5/12/21   Historical Provider, MD   gabapentin (NEURONTIN) 600 MG tablet Take 1,800 mg by mouth 2 times daily. Historical Provider, MD   propranolol (INDERAL) 60 MG tablet Take 1 tablet by mouth 3 times daily 3/17/21   Historical Provider, MD   valsartan (DIOVAN) 320 MG tablet Take 1 tablet by mouth daily 5/12/21   Historical Provider, MD   albuterol sulfate  (90 Base) MCG/ACT inhaler Inhale 1 puff into the lungs every 6 hours as needed 12/15/20 12/15/21  Historical Provider, MD   buprenorphine-naloxone (SUBOXONE) 8-2 MG FILM SL film Place 1 each under the tongue 2 times daily. Historical Provider, MD       Allergies:  Chlorhexidine gluconate    Social History:      The patient currently lives with family    TOBACCO:   reports that she has been smoking cigarettes. She has been smoking about 1.00 pack per day. She has never used smokeless tobacco.  ETOH:   has no history on file for alcohol use. Family History:      Reviewed in detail and negative for DM, CAD, Cancer, CVA. Positive as follows:    No family history on file. Diet:  Diet NPO    REVIEW OF SYSTEMS:   Pertinent positives as noted in the HPI. All other systems reviewed and negative. PHYSICAL EXAM:    BP (!) 150/88   Pulse 85   Temp 99 °F (37.2 °C) (Axillary)   Resp (!) 43   Ht 5' (1.524 m)   Wt 181 lb 8 oz (82.3 kg)   LMP  (LMP Unknown)   SpO2 100%   BMI 35.45 kg/m²     General appearance:  Mild apparent distress, appears stated age and cooperative. HEENT:  Normal cephalic, atraumatic without obvious deformity. Pupils equal, round, and reactive to light. Extra ocular muscles intact. Conjunctivae/corneas clear. Neck: Supple, with full range of motion. No jugular venous distention. Trachea midline. Respiratory: Mild increase in respiratory effort.  Bilateral diffuse crackles. No wheezes or rhonchi  Cardiovascular:  Regular rate and rhythm with normal S1/S2 without murmurs, rubs or gallops. Abdomen: Soft, non-tender, non-distended with normal bowel sounds. Musculoskeletal:  No clubbing, cyanosis or edema bilaterally. Full range of motion without deformity. Skin: Skin color, texture, turgor normal.  No rashes or lesions. Neurologic:  Neurovascularly intact without any focal sensory/motor deficits. Cranial nerves: II-XII intact, grossly non-focal.  Psychiatric:  Alert and oriented, thought content appropriate, normal insight  Capillary Refill: Brisk,< 3 seconds   Peripheral Pulses: +2 palpable, equal bilaterally       Labs:     Recent Labs     06/10/21  1755 06/11/21 0453 06/11/21  2240   WBC 33.0* 30.1* 25.9*   HGB 12.1 11.9* 12.7   HCT 37.0 36.4 41.8    304 304     Recent Labs     06/10/21  1755 06/11/21 0453 06/11/21  2239    137 139   K 4.4 4.6 4.6    104 101   CO2 32 31 24   BUN 16 16 20   CREATININE 0.78 0.71 0.8   CALCIUM 9.6 9.6 9.9   PHOS  --   --  3.3     Recent Labs     06/11/21 0453 06/11/21  2239   AST 55* 44*   ALT 14 12   BILITOT 0.4 0.2*   ALKPHOS 94 108     No results for input(s): INR in the last 72 hours. Recent Labs     06/10/21  1755   TROPONINI <0.012       Urinalysis:      Lab Results   Component Value Date    BLOODU Negative 06/11/2021    GLUCOSEU Negative 06/11/2021       Intake & Output:  No intake/output data recorded. No intake/output data recorded. Radiology:     CXR: Bilateral consolidation. Pneumonia versus Pulmonary edema    EKG:  NSR    XR CHEST PORTABLE   Final Result   Impression:      Bilateral consolidation, question pulmonary edema versus pneumonia.       This document has been electronically signed by: Angelica Ambrocio MD on    06/11/2021 10:49 PM      CT INTERPRETATION OF OUTSIDE IMAGES    (Results Pending)   XR CHEST (2 VW)    (Results Pending)        EXAMINATION: CT CHEST WO CONTRAST        HISTORY: hypoxemia       COMPARISON: None.        TECHNIQUE: CT examination of the chest without IV contrast. Coronal and sagittal    reformations were performed.        Dose reduction techniques were achieved by using automated exposure control    and/or adjustment of mA and/or kV according to patient size and/or use of    iterative reconstruction technique.        AI TECHNOLOGY: This study was processed using [employer] CT Technology.       No prior found.       Current Lung Volume: 1.10 liters (right), 1.00 liters (left)       Nodule 1:   Location: [facility] , Nodule type: Solid    Series 5, Slice 24, Diameter: (Avg) 3.5 mm, (Z-axis) 5.0 mm, Volume: 56 mm^3       Plain radiograph same date Nodule 2:   Location: [facility] , Nodule type: PartSolid    Series number 5, slice 30, Diameter: (Avg) 12.0 mm, (Z-axis) 10.0 mm, Volume:    606 mm^3   - Solid component: Diameter: (Avg) 5.9 mm, (Z-axis) 7.5 mm, Volume: 80 mm^3       Nodule 3:   Location: [facility] , Nodule type: Solid    Series 5, Slice 65, Diameter: (Avg) 4.1 mm, (Z-axis) 2.5 mm, Volume: 43 mm^3       FINDINGS:        The central airways are patent. There are postsurgical changes from prior right    upper lobe wedge resection. There is diffuse bilateral groundglass opacities    bilaterally. There is also mild smooth interlobular septal thickening. There are    also probable reactive lymph nodes within the mediastinum and hilum. Differential consideration includes multilobar pneumonia, pulmonary alveolar    proteinosis, ARDS, drug induced pneumonitis or edema. No pleural effusion or    pneumothorax.       The heart size is normal. The thoracic aorta and main pulmonary artery are    unremarkable. The upper abdomen is unremarkable.       No acute or aggressive osseous abnormality identified. The chest wall is    unremarkable.           Report electronically signed by: Dr. Octaviano Holloway       Impression       1. Diffuse multilobar airspace disease.  Differential consideration includes multilobar pneumonia of uncertain etiology, ARDS, pulmonary edema or pulmonary    alveolar proteinosis. Drug-induced pneumonitis also consideration.               DVT prophylaxis: Lovenox    Code Status: Full CodeA      PT/OT Eval Status: yes    [place] Problems    Diagnosis Date Noted    Acute respiratory failure ([facility] [J96.00] 06/11/2021       PLAN:    Bilateral Multilobar pneumonia- CT chest showed diffuse consolidation that could represent multilobar airspace disease or uncertain etiology, ARDS, Pulmonary edema or pulmonary alveolar proteinosis. Drug induced pneumonitis also needs consideration. Patient remains hypoxic and requiring BIPAP. Will continue Abx (cefepime, levaquin and vanco). Continue BIPAP support as curent. Consult pulmonary to review . PT/OT daily    Acute hypoxic respiratory failure- Secondary to #1. Less likely PE. Appears patient may be developing ILD. Will consult pulmonary for possible repeat Bronch. Patient need to quit tobacco. Continue steroids to wean off slowly. BIPAP support as current. HFrEF- possibly secondary to ILD than DD. Last echo EF 50-55. Continue GDMT and resume lasix home dose. check BNP x1 and  Consider echo if hypoxia not resolving    Anxiety- continue antianxiety at home dose    Hx of Chronic pain narcotic abuse- continue home dose Suboxone  Tobacco abuse- continue nicotine patch  Seizure- Last EEG negative. Continue AEPs as current      Thank you Abelardo Murphy MD for the opportunity to be involved in this patient's care.     Electronically signed by Gilles Julien MD on 6/11/2021 at 11:59 PM

## 2021-06-12 NOTE — PROGRESS NOTES
Comprehensive Nutrition Assessment    Type and Reason for Visit:  Initial, Consult (TF ordering & management)    Nutrition Recommendations/Plan:   When able to start TF, start Vital 1.2cal/ml 20ml/hr & bolus 2.5 proteinex BID. Free water per MD.    Nutrition Assessment:     Pt. nutritionally compromised AEB NPO/on vent  At risk for further nutrition compromise r/t admit with Acute Respiratory Failure, Bilateral Multilobar Pneumonia and underlying medical condition (Hx Cryptogenic Organizing Pneumonia, IV Drug Use, Poor Medical Compliance, ILD, Anxiety, Morbid Obesity, Chronic Pain Narcotic Abuse, Tobacco Abuse ). Nutrition recommendations/interventions as per above. Malnutrition Assessment:  Malnutrition Status: At risk for malnutrition (Comment)    Context:  Acute Illness     Findings of the 6 clinical characteristics of malnutrition:  Energy Intake:  Unable to assess  Weight Loss:  No significant weight loss     Body Fat Loss:  Unable to assess     Muscle Mass Loss:  Unable to assess    Fluid Accumulation:  No significant fluid accumulation     Strength:  Not Performed    Estimated Daily Nutrient Needs:  Energy (kcal):  ~1208kcals ( ~14kcals/kgm); Weight Used for Energy Requirements:   (86.3kgm ( 6/12))     Protein (g):  ~91 grams ( ~ 2 grams protein/kgm IBW); Weight Used for Protein Requirements:  Ideal (IBW 45kgm)        Fluid (ml/day):  per MD;          Nutrition Related Findings:  Pt intubated (6/12). Diprivan infusing 24.7ml/hr ( 652 lipid kcals/24 hrs). Labs: (6/12) Glucose 135, AST 43, BUN 19, creatinine 0.6, MAP 72. No BM. Wounds:  None       Current Nutrition Therapies:    Diet NPO  ADULT TUBE FEEDING; Orogastric; Peptide Based; Continuous; 20; No; 30; Q 4 hours; Protein;  Bolus 1 proteinex 2 Go 2.5 ounces twice/day  Current Tube Feeding (TF) Orders:  · Feeding Route: Orogastric  · Formula: Semi-Elemental  · Schedule: Continuous  · Additives/Modulars: Protein (Proteinex 2 Go 2.5 ounces BID)  · Water Flushes: Per MD  · Goal TF & Flush Orders Provides: Vital 1.2cal/ml 20ml/hr + 2.5 ounces Proteinex BID + Diprivan 24.7ml/hr  yields  1436kcals( 576 TF, 208 proteinex, 652 dip), 88 grams protein (36 TF, 52 proteinex),53 grams CHO, 389ml water from TF (+ 150ml from proteinex)      Anthropometric Measures:  · Height: 5' (152.4 cm)  · Current Body Weight: 190 lb 4.1 oz (86.3 kg) (no edema)   · Admission Body Weight: 181 lb 8 oz (82.3 kg) ((6/11) no edema)    · Usual Body Weight:  (per EMR: (6/10/21) 183# bedscale)     · Ideal Body Weight: 100 lbs;    · BMI: 37.2  · Adjusted Body Weight:  ; No Adjustment   ·    · BMI Categories: Obese Class 2 (BMI 35.0 -39.9)       Nutrition Diagnosis:   · Inadequate oral intake related to impaired respiratory function as evidenced by NPO or clear liquid status due to medical condition, intubation, nutrition support - enteral nutrition      Nutrition Interventions:   Food and/or Nutrient Delivery:  Continue NPO, Start Tube Feeding  Nutrition Education/Counseling:  Education not indicated   Coordination of Nutrition Care:  Continue to monitor while inpatient    Goals:  pt will receive % estimated needs from EN during LOS until able to consume po intake safely. Nutrition Monitoring and Evaluation:   Behavioral-Environmental Outcomes:  None Identified   Food/Nutrient Intake Outcomes:  Enteral Nutrition Intake/Tolerance  Physical Signs/Symptoms Outcomes:  Biochemical Data, Chewing or Swallowing, GI Status, Fluid Status or Edema, Hemodynamic Status, Nutrition Focused Physical Findings, Skin, Weight, Nausea or Vomiting     Discharge Planning:     Too soon to determine     Electronically signed by Lidia Núñez RD, LD on 6/12/21 at 10:00 AM EDT    Contact: [phone]

## 2021-06-12 NOTE — PROGRESS NOTES
A size 7.5 endotracheal tube was successfully placed using a size 3 blade.  The Lot number for he endotracheal tube was [id number]

## 2021-06-12 NOTE — PLAN OF CARE
Problem: Nutrition  Goal: Optimal nutrition therapy  Outcome: Ongoing   Nutrition Problem #1: Inadequate oral intake  Intervention: Food and/or Nutrient Delivery: Continue NPO, Start Tube Feeding  Nutritional Goals: pt will receive % estimated needs from EN during LOS until able to consume po intake safely.

## 2021-06-12 NOTE — FLOWSHEET NOTE
06/12/21 0047   Provider Notification   Reason for Communication Evaluate   Provider Name Dr. Jose Olivarez   Provider Notification Physician   Method of Communication Secure Message   Response See orders   Notification Time [phone]   Provider asked about which meds to give patient. OK-ed to give suboxone once now. Provider also notified of ionized calcium of 1.0. Calcium replacement protocol ordered. Provider was also notified that order for transfer to ICU was hand-written due to system downtime and provider came to bedside to sign his name on order as well.

## 2021-06-12 NOTE — FLOWSHEET NOTE
Charting for Primary RN Javed    Patient arrived to unit from [facility] via bed. Patient transferred to ICU bed and placed on continuous ICU bedside monitor. Patient admitted for Acute respiratory failure ([facility] [address] [J96.00]  Acute respiratory failure ([facility] [address] [J96.00]. Vitals obtained. See flowsheets. Patient's IV access includes 20g left AC and 18 right forearm. Current infusions and rates of infusion include 0.9 @ 100ml/hr. Assessment completed by Kimmie Champion. Two nurse skin assessment completed by Kimmie Champion and Court GONZALEZ. See flowsheets for assessment details. Policies and procedures of ICU able to be explained to patient at this time. Family member(s)/representative(s) present at time of admission include none. Patient rights explained to family member(s)/representatives and patient, as able. Patient/patient's family member(s)/representative(s) Requested to have physician notified of their admission. All questions posed by patient's family member(s)/representative(s) and patient answered at this time.

## 2021-06-12 NOTE — CONSULTS
CRITICAL CARE CONSULT NOTE      Patient: [facility]  Unit/Bed: 4D-02/002-A  YOB: 1985  MRN: [id number]  PCP: Leola Betts MD  Date of Admission: 6/11/2021  Chief Complaint: Shortness of breath    Assessment and Plan:    Acute on chronic hypoxic respiratory failure (baseline oxygen 1-2L as needed)  See differential diagnoses below. Initially on BiPAP, which was subsequently escalated to intubation/mechanical ventilation for tachypnea, increased WOB, airway protection and to maintain SpO2>90%. - Continue sedation to maintain RAAS -1 to -2. Wean as tolerated. - Continue lung protective strategies with peak pressures 35 or less and plateau pressures 30 or less. - Wean mechanical ventilation as tolerated  - See below    Bilateral multilobar pneumonia - Etiology unclear with concern for cryptogenic organizing PNA vs eosinophilic PNA vs interstitial lung disease vs aspiration pneumonitis. Present on arrival. Sepsis considered however leukocytosis secondary to steroid administration, lactic negative, UA negative, flu negative, Respiratory viral panel negative, COVID negative. Started on vancomycin and cefepime and blood cultures drawn.  - Follow blood cultures  - Bronchoscopy today  - Continue current Abx therapy - de-escalation with BAL findings    Hx of cryptogenic organizing pneumonia - s/p VATS with wedge [facility] 2018 [facility] Patient was following with pulmonology but failed to maintain adequate follow-up and compliance with recommendations. Compensated diastolic heart failure - ECHO 50-55% on 6/10/21, moderate TR  - Monitor I/O, daily weights    Moderate pulmonary hypertension - Noted. ECHO 4/2019 estimated RVSP=53mmHg. Patient was following with pulmonology clinic however failed to follow-up on a regular basis. - Recommend outpatient follow-up with pulmonology    Hx of idiopathic intracranial HTN - LP on 6/9/21 with opening pressure=26mm.  10mL CSF removed for therapeutic relief. - Resume aldactone with holding parameters  - Diltiazem, propanolol with holding parameters    Hx of seizure disorder - Noted. EEG 21 with 7 to 8Hz waveforms with mild generalized slowing without epileptiform features. - Resume anti-seizure medications    Hx of chronic pain with narcotic abuse (on suboxone) - Hx use of IV opiates, cocaine, and marijuana. UDS presumptive positive for benzodiazepines, opiates, PCP, and amphetamine - denies use. Patient did receive narcotics at outside facility prior to transfer. - hold suboxone while intubated and sedated    Hx of Anxiety - Noted. - Continue venlafaxine and abilify    Chronic tobacco abuse - Patient noted to be a chain smoker  - Continue nicotine patch    Obesity - Noted. BMI=37. INITIAL H AND P AND ICU COURSE:  Christ Ly Is a 38 y.o. female with PMHx of drug/alcohol abuse, anxiety, IBS, depression, idiopathic intracranial HTN, diastolic heart failure, and cryptogenic organizing pneumonia who initially presented to [facility] [place], operated by [facility] with elevated WBC and ground glass opacities on imaging on 21. Patient was administered ceftriaxone and steroids with minimal improvement. Patient left AMA from [facility] and transferred to [facility]. The patient stayed two days there without clinical improvement and subsequently transferred to [address] [place] [address] for further care and management. Upon arrival patient was admitted by hospital service. See H&P for further details. The patient was transferred to the ICU further worsening work of breathing and increased respiratory distress. The patient was intubated, sedated, and mechanically ventilated. Please see A&P above for further information. Past Medical History: Per HPI  Family History: Mother (alive) - Asthma, ETOH abuse, depression, HTN.  Father () - ETOH abuse, HTN  Social History: tobacco use daily, previous cocaine, opiates, and marijuana    Review of Systems Constitutional: Positive for fatigue. Negative for chills, diaphoresis and fever. HENT: Negative for postnasal drip, sinus pressure and sore throat. Eyes: Negative for photophobia and visual disturbance. Respiratory: Positive for cough, shortness of breath and wheezing. Negative for apnea, choking and chest tightness. Cardiovascular: Negative for chest pain and leg swelling. Gastrointestinal: Negative for abdominal pain, diarrhea, nausea and vomiting. Endocrine: Negative for cold intolerance and heat intolerance. Genitourinary: Negative for difficulty urinating, dysuria, frequency and hematuria. Musculoskeletal: Negative for back pain, joint swelling and neck pain. Skin: Negative for rash and wound. Allergic/Immunologic: Positive for environmental allergies. Negative for food allergies. Neurological: Positive for seizures. Negative for syncope, weakness and light-headedness. Hematological: Negative for adenopathy. Does not bruise/bleed easily. Psychiatric/Behavioral: Negative for confusion and sleep disturbance. The patient is not hyperactive. All other systems reviewed and are negative.       Scheduled Meds:   lamoTRIgine  50 mg Oral BID    [START ON 6/13/2021] nicotine  1 patch Transdermal Daily    gabapentin  300 mg Oral TID    propranolol  60 mg Oral TID    calcium replacement protocol   Other RX Placeholder    etomidate        etomidate  20 mg Intravenous Once    midazolam        famotidine (PEPCID) injection  20 mg Intravenous BID    ARIPiprazole  5 mg Oral Daily    buprenorphine-naloxone  1 Film Sublingual BID    dilTIAZem  120 mg Oral Daily    pantoprazole  40 mg Oral BID    spironolactone  25 mg Oral Daily    valsartan  320 mg Oral Daily    venlafaxine  150 mg Oral QAM    sodium chloride flush  5-40 mL Intravenous 2 times per day    enoxaparin  40 mg Subcutaneous Daily    ipratropium-albuterol  1 ampule Inhalation Q4H WA    cefepime  2,000 mg Intravenous Q8H    And    levofloxacin  750 mg Intravenous Q24H    vancomycin  15 mg/kg Intravenous Q8H    methylPREDNISolone  125 mg Intravenous Q8H    vancomycin (VANCOCIN) intermittent dosing (placeholder)   Other RX Placeholder     Continuous Infusions:   propofol 10 mcg/kg/min (06/12/21 3960)    midazolam      sodium chloride         PHYSICAL EXAMINATION:  Vitals:  Temp: 99.2 °F (37.3 °C)  Pulse: 92  Resp: 21  BP: 136/81  FiO2 : 100 %  SpO2: 98 %  Eros Coma Scale  Eye Opening: Spontaneous  Best Verbal Response: Oriented  Best Motor Response: Obeys commands  Eros Coma Scale Score: 15      Input/Output  In: 97.2   Out: 500 [Urine:500]    BMI:  Body mass index is 37.16 kg/m². Oxygenation/Ventilation  Vent Information  Vent Mode: PCV+  Rate Set: 12 bmp  FiO2 : 100 %  PEEP/CPAP: 8  Pressure Ordered: 32 (PControl 24)    Vent Patient Data  Peak Inspiratory Pressure: 30 cmH2O  Mean Airway Pressure: 17 cmH20  Rate Measured: 16 br/min  Vt Exhaled: 260 mL  Minute Volume: 5.3 Liters  I:E Ratio: 1:1.5  Static Compliance: 17.6 mL/cmH2O      Physical Exam  Vitals and nursing note reviewed. Constitutional:       General: She is awake. She is in acute distress. Appearance: Normal appearance. She is obese. She is ill-appearing. Interventions: Face mask in place. HENT:      Head: Normocephalic and atraumatic. Right Ear: External ear normal.      Left Ear: External ear normal.      Nose: Nose normal.      Mouth/Throat:      Mouth: Mucous membranes are moist.      Pharynx: Oropharynx is clear. Eyes:      Conjunctiva/sclera: Conjunctivae normal.      Pupils: Pupils are equal, round, and reactive to light. Neck:      Trachea: Trachea and phonation normal.   Cardiovascular:      Rate and Rhythm: Normal rate and regular rhythm. Pulses: Normal pulses. Dorsalis pedis pulses are 2+ on the right side and 2+ on the left side.         Posterior tibial pulses are 2+ on the right side and 2+ on the left

## 2021-06-13 LAB
ALBUMIN SERPL-MCNC: 3.5 G/DL (ref 3.5–5.1)
ALP BLD-CCNC: 86 U/L (ref 38–126)
ALT SERPL-CCNC: 11 U/L (ref 11–66)
ANION GAP SERPL CALCULATED.3IONS-SCNC: 6 MEQ/L (ref 8–16)
AST SERPL-CCNC: 17 U/L (ref 5–40)
BAL CHARACTER: ABNORMAL
BAL COLLECTION SITE: ABNORMAL
BAL COLOR: COLORLESS
BASOPHILS # BLD: 0.1 %
BASOPHILS ABSOLUTE: 0 THOU/MM3 (ref 0–0.1)
BASOPHILS, BAL FLUID: 1 % (ref 0–1)
BILIRUB SERPL-MCNC: < 0.2 MG/DL (ref 0.3–1.2)
BUN BLDV-MCNC: 21 MG/DL (ref 7–22)
CALCIUM IONIZED: 1.33 MMOL/L (ref 1.12–1.32)
CALCIUM SERPL-MCNC: 9.8 MG/DL (ref 8.5–10.5)
CHLORIDE BLD-SCNC: 101 MEQ/L (ref 98–111)
CILIATED/EPITHELIAL CELLS BAL: 80 % (ref 0–5)
CO2: 31 MEQ/L (ref 23–33)
CREAT SERPL-MCNC: 0.5 MG/DL (ref 0.4–1.2)
EOSINOPHIL # BLD: 1.1 %
EOSINOPHILS ABSOLUTE: 0.2 THOU/MM3 (ref 0–0.4)
EOSINOPHILS BAL: 0 % (ref 0–1)
ERYTHROCYTE [DISTWIDTH] IN BLOOD BY AUTOMATED COUNT: 12.6 % (ref 11.5–14.5)
ERYTHROCYTE [DISTWIDTH] IN BLOOD BY AUTOMATED COUNT: 44.9 FL (ref 35–45)
GFR SERPL CREATININE-BSD FRML MDRD: > 90 ML/MIN/1.73M2
GLUCOSE BLD-MCNC: 108 MG/DL (ref 70–108)
GLUCOSE BLD-MCNC: 127 MG/DL (ref 70–108)
GLUCOSE BLD-MCNC: 139 MG/DL (ref 70–108)
HCT VFR BLD CALC: 35 % (ref 37–47)
HEMOGLOBIN: 11 GM/DL (ref 12–16)
IMMATURE GRANS (ABS): 0.12 THOU/MM3 (ref 0–0.07)
IMMATURE GRANULOCYTES: 0.6 %
LEGIONELLA PNEUMOPHILIA AG, URINE: NEGATIVE
LYMPHOCYTES # BLD: 9.5 %
LYMPHOCYTES ABSOLUTE: 1.8 THOU/MM3 (ref 1–4.8)
LYMPHOCYTES, BAL: 5 % (ref 10–15)
MACROPHAGE/MONOCYTE BAL: 4 % (ref 86–100)
MCH RBC QN AUTO: 30.3 PG (ref 26–33)
MCHC RBC AUTO-ENTMCNC: 31.4 GM/DL (ref 32.2–35.5)
MCV RBC AUTO: 96.4 FL (ref 81–99)
MONOCYTES # BLD: 3 %
MONOCYTES ABSOLUTE: 0.6 THOU/MM3 (ref 0.4–1.3)
NUCLEATED RED BLOOD CELLS: 0 /100 WBC
PATHOLOGIST REVIEW: ABNORMAL
PLATELET # BLD: 311 THOU/MM3 (ref 130–400)
PMV BLD AUTO: 10.3 FL (ref 9.4–12.4)
POTASSIUM REFLEX MAGNESIUM: 4.2 MEQ/L (ref 3.5–5.2)
RBC # BLD: 3.63 MILL/MM3 (ref 4.2–5.4)
RBC BAL: 105 /CUMM
SARS-COV-2 ANTIBODY, TOTAL: NEGATIVE
SEG NEUTROPHILS: 85.7 %
SEGMENTED NEUTROPHILS ABSOLUTE COUNT: 16.2 THOU/MM3 (ref 1.8–7.7)
SEGMENTED NEUTROPHILS, BAL: 10 % (ref 0–3)
SODIUM BLD-SCNC: 138 MEQ/L (ref 135–145)
STREP PNEUMO AG, UR: NEGATIVE
TOTAL NUCLEATED CELLS BAL: 514 /CUMM
TOTAL PROTEIN: 6.4 G/DL (ref 6.1–8)
TOTAL VOLUME RECEIVED BAL: 25 ML
WBC # BLD: 18.9 THOU/MM3 (ref 4.8–10.8)

## 2021-06-13 PROCEDURE — 82330 ASSAY OF CALCIUM: CPT

## 2021-06-13 PROCEDURE — 80053 COMPREHEN METABOLIC PANEL: CPT

## 2021-06-13 PROCEDURE — 6370000000 HC RX 637 (ALT 250 FOR IP): Performed by: STUDENT IN AN ORGANIZED HEALTH CARE EDUCATION/TRAINING PROGRAM

## 2021-06-13 PROCEDURE — 36415 COLL VENOUS BLD VENIPUNCTURE: CPT

## 2021-06-13 PROCEDURE — 2000000000 HC ICU R&B

## 2021-06-13 PROCEDURE — 6360000002 HC RX W HCPCS: Performed by: STUDENT IN AN ORGANIZED HEALTH CARE EDUCATION/TRAINING PROGRAM

## 2021-06-13 PROCEDURE — 2580000003 HC RX 258: Performed by: INTERNAL MEDICINE

## 2021-06-13 PROCEDURE — 6370000000 HC RX 637 (ALT 250 FOR IP): Performed by: INTERNAL MEDICINE

## 2021-06-13 PROCEDURE — 94003 VENT MGMT INPAT SUBQ DAY: CPT

## 2021-06-13 PROCEDURE — 99291 CRITICAL CARE FIRST HOUR: CPT | Performed by: INTERNAL MEDICINE

## 2021-06-13 PROCEDURE — 85025 COMPLETE CBC W/AUTO DIFF WBC: CPT

## 2021-06-13 PROCEDURE — 2500000003 HC RX 250 WO HCPCS: Performed by: FAMILY MEDICINE

## 2021-06-13 PROCEDURE — 6370000000 HC RX 637 (ALT 250 FOR IP): Performed by: FAMILY MEDICINE

## 2021-06-13 PROCEDURE — 2580000003 HC RX 258: Performed by: FAMILY MEDICINE

## 2021-06-13 PROCEDURE — 94640 AIRWAY INHALATION TREATMENT: CPT

## 2021-06-13 PROCEDURE — 2700000000 HC OXYGEN THERAPY PER DAY

## 2021-06-13 PROCEDURE — 94761 N-INVAS EAR/PLS OXIMETRY MLT: CPT

## 2021-06-13 PROCEDURE — 2580000003 HC RX 258: Performed by: STUDENT IN AN ORGANIZED HEALTH CARE EDUCATION/TRAINING PROGRAM

## 2021-06-13 PROCEDURE — C9113 INJ PANTOPRAZOLE SODIUM, VIA: HCPCS | Performed by: STUDENT IN AN ORGANIZED HEALTH CARE EDUCATION/TRAINING PROGRAM

## 2021-06-13 PROCEDURE — 6360000002 HC RX W HCPCS: Performed by: INTERNAL MEDICINE

## 2021-06-13 PROCEDURE — 82948 REAGENT STRIP/BLOOD GLUCOSE: CPT

## 2021-06-13 RX ORDER — IPRATROPIUM BROMIDE AND ALBUTEROL SULFATE 2.5; .5 MG/3ML; MG/3ML
1 SOLUTION RESPIRATORY (INHALATION) ONCE
Status: COMPLETED | OUTPATIENT
Start: 2021-06-13 | End: 2021-06-13

## 2021-06-13 RX ORDER — KETOROLAC TROMETHAMINE 30 MG/ML
30 INJECTION, SOLUTION INTRAMUSCULAR; INTRAVENOUS ONCE
Status: COMPLETED | OUTPATIENT
Start: 2021-06-13 | End: 2021-06-13

## 2021-06-13 RX ADMIN — METHYLPREDNISOLONE SODIUM SUCCINATE 40 MG: 40 INJECTION, POWDER, FOR SOLUTION INTRAMUSCULAR; INTRAVENOUS at 08:15

## 2021-06-13 RX ADMIN — CEFEPIME HYDROCHLORIDE 2000 MG: 2 INJECTION, POWDER, FOR SOLUTION INTRAVENOUS at 09:20

## 2021-06-13 RX ADMIN — FENTANYL CITRATE 50 MCG: 50 INJECTION, SOLUTION INTRAMUSCULAR; INTRAVENOUS at 09:19

## 2021-06-13 RX ADMIN — GABAPENTIN 300 MG: 600 TABLET, FILM COATED ORAL at 20:43

## 2021-06-13 RX ADMIN — KETOROLAC TROMETHAMINE 30 MG: 30 INJECTION, SOLUTION INTRAMUSCULAR; INTRAVENOUS at 14:28

## 2021-06-13 RX ADMIN — IPRATROPIUM BROMIDE AND ALBUTEROL SULFATE 1 AMPULE: .5; 3 SOLUTION RESPIRATORY (INHALATION) at 07:04

## 2021-06-13 RX ADMIN — IPRATROPIUM BROMIDE AND ALBUTEROL SULFATE 1 AMPULE: .5; 3 SOLUTION RESPIRATORY (INHALATION) at 07:02

## 2021-06-13 RX ADMIN — BUPRENORPHINE AND NALOXONE 1 FILM: 8; 2 FILM BUCCAL; SUBLINGUAL at 20:41

## 2021-06-13 RX ADMIN — CEFEPIME HYDROCHLORIDE 2000 MG: 2 INJECTION, POWDER, FOR SOLUTION INTRAVENOUS at 01:35

## 2021-06-13 RX ADMIN — PROPRANOLOL HYDROCHLORIDE 60 MG: 20 TABLET ORAL at 11:06

## 2021-06-13 RX ADMIN — LAMOTRIGINE 50 MG: 25 TABLET ORAL at 11:03

## 2021-06-13 RX ADMIN — DEXMEDETOMIDINE 0.3 MCG/KG/HR: 100 INJECTION, SOLUTION, CONCENTRATE INTRAVENOUS at 08:22

## 2021-06-13 RX ADMIN — VENLAFAXINE 50 MG: 50 TABLET ORAL at 20:41

## 2021-06-13 RX ADMIN — LEVOFLOXACIN 750 MG: 5 INJECTION, SOLUTION INTRAVENOUS at 02:06

## 2021-06-13 RX ADMIN — LAMOTRIGINE 50 MG: 25 TABLET ORAL at 20:41

## 2021-06-13 RX ADMIN — BUPRENORPHINE AND NALOXONE 1 FILM: 8; 2 FILM BUCCAL; SUBLINGUAL at 11:06

## 2021-06-13 RX ADMIN — PANTOPRAZOLE SODIUM 40 MG: 40 INJECTION, POWDER, FOR SOLUTION INTRAVENOUS at 08:15

## 2021-06-13 RX ADMIN — PROPOFOL 45 MCG/KG/MIN: 10 INJECTION, EMULSION INTRAVENOUS at 05:30

## 2021-06-13 RX ADMIN — SPIRONOLACTONE 25 MG: 25 TABLET ORAL at 11:06

## 2021-06-13 RX ADMIN — ARIPIPRAZOLE 5 MG: 5 TABLET ORAL at 11:03

## 2021-06-13 RX ADMIN — SODIUM CHLORIDE, PRESERVATIVE FREE 10 ML: 5 INJECTION INTRAVENOUS at 08:15

## 2021-06-13 RX ADMIN — VENLAFAXINE 50 MG: 50 TABLET ORAL at 11:02

## 2021-06-13 RX ADMIN — GABAPENTIN 300 MG: 600 TABLET, FILM COATED ORAL at 15:28

## 2021-06-13 RX ADMIN — DEXMEDETOMIDINE 0.9 MCG/KG/HR: 100 INJECTION, SOLUTION, CONCENTRATE INTRAVENOUS at 21:41

## 2021-06-13 RX ADMIN — ACETAMINOPHEN 650 MG: 325 TABLET ORAL at 20:41

## 2021-06-13 RX ADMIN — MIDAZOLAM 1 MG/HR: 5 INJECTION INTRAMUSCULAR; INTRAVENOUS at 01:15

## 2021-06-13 RX ADMIN — DEXMEDETOMIDINE 86 MCG: 100 INJECTION, SOLUTION, CONCENTRATE INTRAVENOUS at 08:08

## 2021-06-13 RX ADMIN — DILTIAZEM HYDROCHLORIDE 30 MG: 30 TABLET, FILM COATED ORAL at 01:36

## 2021-06-13 RX ADMIN — CEFEPIME HYDROCHLORIDE 2000 MG: 2 INJECTION, POWDER, FOR SOLUTION INTRAVENOUS at 16:34

## 2021-06-13 RX ADMIN — VENLAFAXINE 50 MG: 50 TABLET ORAL at 15:28

## 2021-06-13 RX ADMIN — PROPOFOL 50 MCG/KG/MIN: 10 INJECTION, EMULSION INTRAVENOUS at 02:14

## 2021-06-13 RX ADMIN — DEXMEDETOMIDINE 0.9 MCG/KG/HR: 100 INJECTION, SOLUTION, CONCENTRATE INTRAVENOUS at 15:29

## 2021-06-13 RX ADMIN — GABAPENTIN 300 MG: 600 TABLET, FILM COATED ORAL at 11:07

## 2021-06-13 ASSESSMENT — PAIN SCALES - GENERAL
PAINLEVEL_OUTOF10: 0
PAINLEVEL_OUTOF10: 3
PAINLEVEL_OUTOF10: 8
PAINLEVEL_OUTOF10: 6
PAINLEVEL_OUTOF10: 0
PAINLEVEL_OUTOF10: 0

## 2021-06-13 ASSESSMENT — PULMONARY FUNCTION TESTS
PIF_VALUE: 27
PIF_VALUE: 27

## 2021-06-13 ASSESSMENT — PAIN DESCRIPTION - DESCRIPTORS: DESCRIPTORS: HEADACHE

## 2021-06-13 ASSESSMENT — PAIN DESCRIPTION - ONSET: ONSET: GRADUAL

## 2021-06-13 ASSESSMENT — PAIN DESCRIPTION - ORIENTATION: ORIENTATION: MID

## 2021-06-13 ASSESSMENT — PAIN - FUNCTIONAL ASSESSMENT: PAIN_FUNCTIONAL_ASSESSMENT: ACTIVITIES ARE NOT PREVENTED

## 2021-06-13 ASSESSMENT — PAIN DESCRIPTION - PROGRESSION: CLINICAL_PROGRESSION: GRADUALLY WORSENING

## 2021-06-13 ASSESSMENT — PAIN DESCRIPTION - PAIN TYPE: TYPE: ACUTE PAIN

## 2021-06-13 ASSESSMENT — PAIN DESCRIPTION - LOCATION: LOCATION: HEAD

## 2021-06-13 ASSESSMENT — PAIN DESCRIPTION - FREQUENCY: FREQUENCY: INTERMITTENT

## 2021-06-13 NOTE — PLAN OF CARE
Problem: Pain:  Goal: Pain level will decrease  Description: Pain level will decrease  6/13/2021 0202 by Isamar Frederick RN  Outcome: Ongoing     Problem: Non-Violent Restraints  Goal: Removal from restraints as soon as assessed to be safe  6/13/2021 0202 by Isamar Frederick RN  Outcome: Ongoing  Note: Patient still requiring restraints at this time     Problem: Non-Violent Restraints  Goal: No harm/injury to patient while restraints in use  6/13/2021 0202 by Isamar Frederick RN  Outcome: Ongoing  Note: No restraint related injuries noted     Problem: Non-Violent Restraints  Goal: Patient's dignity will be maintained  6/13/2021 0202 by Isamar Frederick RN  Outcome: Ongoing     Problem: Nutrition  Goal: Optimal nutrition therapy  6/13/2021 0202 by Isamar Frederick RN  Outcome: Ongoing  Patient unable to participate in care planning. No family at bedside.

## 2021-06-13 NOTE — PLAN OF CARE
Problem: Pain:  Goal: Pain level will decrease  Description: Pain level will decrease  6/13/2021 1322 by Marli Russ RN  Outcome: Ongoing  Note: Intermittent complaints of pain - medications per orders. Problem: Pain:  Goal: Control of acute pain  Description: Control of acute pain  Outcome: Ongoing  Note: Intermittent complaints of pain - medications per orders. Problem: Pain:  Goal: Control of chronic pain  Description: Control of chronic pain  Outcome: Ongoing  Note: Intermittent complaints of pain - medications per orders. Problem: Nutrition  Goal: Optimal nutrition therapy  6/13/2021 1322 by Marli Russ RN  Outcome: Ongoing  Note: Patient passed bedside swallow evaluation, start oral intake when appropriate. Problem: Falls - Risk of:  Goal: Will remain free from falls  Description: Will remain free from falls  Outcome: Ongoing  Note: Remains free from falls - remains in bed this shift with fall precautions in place. Problem: Falls - Risk of:  Goal: Absence of physical injury  Description: Absence of physical injury  Outcome: Ongoing  Note: Remains free from injury     Problem: Skin Integrity:  Goal: Will show no infection signs and symptoms  Description: Will show no infection signs and symptoms  Outcome: Ongoing  Note: Remains on antibiotics. Problem: Skin Integrity:  Goal: Absence of new skin breakdown  Description: Absence of new skin breakdown  Outcome: Ongoing  Note: No new skin breakdown noted. Patient turns and repositions self frequently     Problem: Non-Violent Restraints  Goal: Removal from restraints as soon as assessed to be safe  6/13/2021 1322 by Marli Russ RN  Outcome: Completed  Note: Restraints removed with extubation this shift. Problem: Non-Violent Restraints  Goal: No harm/injury to patient while restraints in use  6/13/2021 1322 by Marli Russ RN  Outcome: Completed  Note: No harm noted.      Problem: Non-Violent Restraints  Goal: Patient's

## 2021-06-13 NOTE — PROGRESS NOTES
0345 -- -- -- 62 12 96 %   06/13/21 0330 108/63 -- -- 63 11 96 %   06/13/21 0315 -- -- -- 63 13 96 %   06/13/21 0300 111/68 -- -- 63 13 97 %   06/13/21 0245 -- -- -- 63 13 96 %   06/13/21 0230 110/63 -- -- 65 14 96 %   06/13/21 0215 -- -- -- 66 14 96 %   06/13/21 0200 117/66 -- -- 67 13 94 %   06/13/21 0145 -- -- -- 68 14 94 %       EXAM:  General: On BiPAP. In no significant distress. Obese, ill-appearing. Eyes: PERRL. No sclera icterus. No conjunctival injection. ENT: No discharge. Neck: Trachea midline. Normal thyroid. Resp: No accessory muscle use. Decreased breath sounds bilaterally, crackles. CV: Regular rate. Regular rhythm. No mumur or rub. No edema. Abd: Non-tender. Non-distended. No masses. No organmegaly. Normal bowel sounds. No hernia. Skin: Warm and dry. No nodule on exposed extremities. No rash on exposed extremities. Lymph: No cervical LAD. No supraclavicular LAD. M/S: No cyanosis. No joint deformity. No clubbing. Neuro: Awake. Follows commands. Intake/Output Summary (Last 24 hours) at 6/13/2021 0932  Last data filed at 6/13/2021 0527  Gross per 24 hour   Intake 2305 ml   Output 1950 ml   Net 355 ml     I/O last 3 completed shifts: In: 9879 [I.V.:1813; NG/GT:492]  Out: 1950 [Urine:1950]   Date 06/13/21 0000 - 06/13/21 2359   Shift 2494-8813 2720-8317 4107-2981 24 Hour Total   INTAKE   I.V.(mL/kg) 477(5.5)   477(5.5)   NG/GT(mL/kg) 220(2.5)   220(2.5)   Shift Total(mL/kg) 697(8.1)   697(8.1)   OUTPUT   Urine(mL/kg/hr) 600(0.9)   600   Shift Total(mL/kg) 600(7)   600(7)   Weight (kg) 86.3 86.3 86.3 86.3     Wt Readings from Last 3 Encounters:   06/12/21 190 lb 4.1 oz (86.3 kg)   06/11/21 183 lb (83 kg)      Body mass index is 37.16 kg/m².          Scheduled Meds:   lamoTRIgine  50 mg Oral BID    nicotine  1 patch Transdermal Daily    gabapentin  300 mg Oral TID    propranolol  60 mg Oral TID    calcium replacement protocol   Other RX Placeholder    pantoprazole  40 mg Intravenous Daily    venlafaxine  50 mg Oral TID    dilTIAZem  30 mg Oral 4 times per day    methylPREDNISolone  40 mg Intravenous Daily    sodium chloride flush  5-40 mL Intravenous 2 times per day    cefepime  2,000 mg Intravenous Q8H    And    levofloxacin  750 mg Intravenous Q24H    ARIPiprazole  5 mg Oral Daily    buprenorphine-naloxone  1 Film Sublingual BID    spironolactone  25 mg Oral Daily    [Held by provider] valsartan  320 mg Oral Daily    enoxaparin  40 mg Subcutaneous Daily     Continuous Infusions:   dexmedetomidine 0.4 mcg/kg/hr (06/13/21 0857)    propofol Stopped (06/13/21 0655)    midazolam Stopped (06/13/21 0655)    sodium chloride       PRN Meds:fentanNYL, 50 mcg, Q3H PRN  albuterol sulfate HFA, 1 puff, Q6H PRN  sodium chloride flush, 5-40 mL, PRN  sodium chloride, 25 mL, PRN  ondansetron, 4 mg, Q8H PRN   Or  ondansetron, 4 mg, Q6H PRN  polyethylene glycol, 17 g, Daily PRN  acetaminophen, 650 mg, Q6H PRN   Or  acetaminophen, 650 mg, Q6H PRN        PARENTERAL VASOACTIVE / INOTROPIC AGENTS:    SEDATION/ANALGESIA:      ICU PROPHYLAXIS/THERAPY:   Stress ulcer: [x] PPI Agent  [] H2RA  [] Sucralfate [] Other:   VTE: [x] Enoxaparin     [] Warfarin  [] NOAC     [] PCD Device:Bilat LE           [] Heparin: [] Subcut / [] IV    NUTRITION SUPPORT:    SUPPORT DEVICES:    Oxygen Delivery - O2 Flow Rate (L/min): 50 L/min  VENT SETTINGS (Comprehensive)  Vent Information  $Ventilation: $Subsequent Day  Skin Assessment: Clean, dry, & intact  Suction Catheter Diameter: 14  Equipment ID: [id number]  Equipment Changed: Vent Circuit  Vent Type: C2G5 [place]  Vent Mode: PCV+  Pressure Ordered: 28 (PControl 22)  Rate Set: 12 bmp  Peak Flow: 42.4 L/min  FiO2 : 40 %  SpO2: 97 %  SpO2/FiO2 ratio: 242.5  Sensitivity: 3  PEEP/CPAP: 6  I Time/ I Time %: 1 s  Humidification Source: Heated wire  Humidification Temp: 37  Humidification Temp Measured: 37  Circuit Condensation: Drained  Mask Type: Full face mask  Mask 06/12/21 2140     Order Status: Completed Updated: 06/12/21 2142     Narrative:       PROCEDURE: XR CHEST PORTABLE     CLINICAL INFORMATION: ETT/OGT placement     COMPARISON: No prior study. TECHNIQUE:  AP mobile chest synovial       FINDINGS:     There was determined that the chest x-ray examination dated 6/12/2021 at 6:55 AM was not interpreted and was in need of interpretation. The report is as follows: There is an endotracheal tube present overlying the lower trachea with the tip projecting 4 mm above the juni directed towards the right mainstem bronchus. The heart size is normal.     Patchy airspace opacities are seen throughout the lungs bilaterally. This is most pronounced at the left greater than right lung bases. No pneumothorax is seen. No definite effusion is seen. There is an enteric tube coursing below the diaphragm, the distal portions of which are excluded from the current examination. No acute osseous findings are seen.      Impression:       1. There is an endotracheal tube present overlying the lower trachea with the tip projecting 4 mm above the juni directed towards the right mainstem bronchus. The endotracheal tube was in need of readjustment. However at the time of interpretation of   this examination, the endotracheal tube had been readjusted. The post ET tube adjustment chest x-ray is reported separately and was performed on 6/12/2021 at 7:04 AM. Please refer to that report for further details. 2. Patchy airspace opacities are seen throughout the lungs bilaterally. This is most pronounced at the left greater than right lung bases. These findings were discussed with the nurse caring for the patient at the time of dictation, Christian Mcardle RN, on 6/12/2021 at 9:30 PM. Of note a subsequent chest x-ray dated 6/12/2021 times 7:04 AM was performed and was already reported. This chest x-ray    was performed following ET tube adjustment.  Please refer to that report for further details. **This report has been created using voice recognition software.  It may contain minor errors which are inherent in voice recognition technology. **     Final report electronically signed by Dr. Zulema Orellana on 6/12/2021 9:40 PM     XR CHEST PORTABLE [[id number]] Resulted: 06/12/21 0742     Order Status: Completed Updated: 06/12/21 0744     Narrative:       PROCEDURE: XR CHEST PORTABLE     CLINICAL INFORMATION: ett adjustment. COMPARISON: Chest x-ray obtained on the same day at 0655 hours. .     TECHNIQUE: AP upright view of the chest.     FINDINGS:   The tip of the endotracheal tube is 11 mm above the juni. There is an enteric tube in place. There is borderline cardiomegaly. The mediastinum is not widened.  There is extensive bilateral pulmonary opacification. The pulmonary vascularity is normal.   No suspicious osseous lesions are present.      Impression:       1. The tip of the endotracheal tube is an 11 mm above the juni. 2. There is extensive bilateral pulmonary opacification. 3. There is borderline cardiomegaly. 4. There is no change in enteric tube. **This report has been created using voice recognition software. It may contain minor errors which are inherent in voice recognition technology. **     Final report electronically signed by DR Arcelia Milan on 6/12/2021 7:42 AM     CT INTERPRETATION OF OUTSIDE IMAGES [[id number]] Collected: 06/11/21 2300     Order Status: Completed Updated: 06/12/21 0000     Narrative:       CT chest without contrast     Comparison: None     Findings:     Diffuse severe bilateral groundglass consolidation. Findings may represent pneumonia. There are numerous other possibilities. No significant pleural effusion demonstrated. Scattered 1 cm mediastinal lymph nodes, possibly reactive   No acute bony abnormalities.      Impression:       Impression:     Diffuse bilateral groundglass consolidation, possibly pneumonia.      Mildly enlarged mediastinal nodes, likely reactive. This document has been electronically signed by: Timoteo Castillo MD on   06/12/2021 12:00 AM     All CTs at this facility use dose modulation techniques and iterative   reconstructions, and/or weight-based dosing   when appropriate to reduce radiation to a low as reasonably achievable.     XR CHEST (2 VW) [[id number]]      Order Status: Canceled Specimen: Chest      XR CHEST PORTABLE [[id number]] Collected: 06/11/21 2149     Order Status: Completed Updated: 06/11/21 2250     Narrative:       1 view chest x-ray. Comparison: None     Findings:     Diffuse severe bilateral nonspecific consolidation. Possibilities include pneumonia, pulmonary edema and ARDS. Borderline cardiac enlargement noted.  Question pleural effusions. No acute bony abnormalities.      Impression:       Impression:     Bilateral consolidation, question pulmonary edema versus pneumonia. This document has been electronically signed by: Timoteo Castillo MD on   06/11/2021 10:49 PM           Patient Active Problem List   Diagnosis    Aspiration pneumonia due to regurgitated food ([address]    Pneumonia    Idiopathic intracranial hypertension    Seizure disorder ([address]    Severe obesity (BMI 35.0-35.9 with comorbidity) ([address]    Hypoxemia    Chronic heart failure with preserved ejection fraction (HCC)    Acute respiratory failure (HCC)       Assessment and plan:    Acute on chronic hypoxic respiratory failure   Self extubated today. Tolerating BiPAP so far. We will try high flow oxygen later today. CT scan of the chest showed significant bilateral groundglass appearance. It appears to be acute as the patient had previous CT scan in May that showed hazy appearance but no impressive groundglass according to the report. Patient had organizing pneumonia 2018 and was treated with steroids with good response.   She has pulmonary hypertension following in [address] [place] [address], however did not see right heart cath done. She had left heart cath which was unimpressive. She had pulmonary function test that showed low residual volume and DLCO with no significant obstruction. The patient is smoker. No clear history if she inhaled any drugs prior to this episodes but the drug screen is positive for benzo, opiates and PCP. Patient had history of joint swelling and she was seen by rheumatology and had rheumatology work-up which was negative. Differential diagnoses include infectious and noninfectious which is most likely. Noninfectious including acute eosinophilic pneumonia, RB ILD, CTD ILD, inhalation injury, and less likely organizing pneumonia. Bronchoscopy was done yesterday that showed normal airways with no sign of pneumonia. BAL was not helpful. Patient received steroids prior to admission. Getting cryobiopsy will be beneficial however it is not safe with pulmonary hypertension. Rheumatology work-up was sent and pending. She is on antibiotics coverage, follow-up cultures, discontinue antibiotics if negative. Sent for bacterial and fungal culture including PCP. HIV is pending. UA negative, flu negative, Respiratory viral panel negative, COVID negative. Will treat with steroids for now. Will do serial chest x-ray to monitor the response of the infiltrates on the treatment.       Hx of cryptogenic organizing pneumonia - s/p VATS with wedge biopsy in 2018 (see [facility] for additional info).    Compensated diastolic heart failure - ECHO 50-55% on 6/10/21, moderate TR  - Monitor I/O, daily weights     pulmonary hypertension - Noted. ECHO 4/2019 estimated RVSP=53mmHg. Did not see right heart cath done. It is contributing to the hypoxia but not the cause of the acute presentation as the patient has significant bilateral groundglass appearance on imaging. Repeat echo, reading pending. .       Hx of idiopathic intracranial HTN - LP on 6/9/21 with opening pressure=26mm.  10mL CSF removed for

## 2021-06-14 LAB
ALBUMIN SERPL-MCNC: 3.5 G/DL (ref 3.5–5.1)
ALP BLD-CCNC: 90 U/L (ref 38–126)
ALT SERPL-CCNC: 9 U/L (ref 11–66)
ANION GAP SERPL CALCULATED.3IONS-SCNC: 7 MEQ/L (ref 8–16)
AST SERPL-CCNC: 11 U/L (ref 5–40)
BASOPHILS # BLD: 0.2 %
BASOPHILS ABSOLUTE: 0 THOU/MM3 (ref 0–0.1)
BILIRUB SERPL-MCNC: 0.2 MG/DL (ref 0.3–1.2)
BUN BLDV-MCNC: 24 MG/DL (ref 7–22)
CALCIUM SERPL-MCNC: 9.8 MG/DL (ref 8.5–10.5)
CHLORIDE BLD-SCNC: 102 MEQ/L (ref 98–111)
CO2: 29 MEQ/L (ref 23–33)
CREAT SERPL-MCNC: 0.5 MG/DL (ref 0.4–1.2)
CYCLIC CITRULLINATED PEPTIDE ANTIBODY IGG: 0.9 U/ML (ref 0–7)
EOSINOPHIL # BLD: 2.3 %
EOSINOPHILS ABSOLUTE: 0.4 THOU/MM3 (ref 0–0.4)
ERYTHROCYTE [DISTWIDTH] IN BLOOD BY AUTOMATED COUNT: 12.2 % (ref 11.5–14.5)
ERYTHROCYTE [DISTWIDTH] IN BLOOD BY AUTOMATED COUNT: 42.9 FL (ref 35–45)
GFR SERPL CREATININE-BSD FRML MDRD: > 90 ML/MIN/1.73M2
GLUCOSE BLD-MCNC: 119 MG/DL (ref 70–108)
GRAM STAIN RESULT: NORMAL
GRAM STAIN RESULT: NORMAL
HCT VFR BLD CALC: 38.8 % (ref 37–47)
HEMOGLOBIN: 12.1 GM/DL (ref 12–16)
HIV AG/AB: NONREACTIVE
IMMATURE GRANS (ABS): 0.11 THOU/MM3 (ref 0–0.07)
IMMATURE GRANULOCYTES: 0.7 %
LYMPHOCYTES # BLD: 19 %
LYMPHOCYTES ABSOLUTE: 3.1 THOU/MM3 (ref 1–4.8)
MCH RBC QN AUTO: 29.8 PG (ref 26–33)
MCHC RBC AUTO-ENTMCNC: 31.2 GM/DL (ref 32.2–35.5)
MCV RBC AUTO: 95.6 FL (ref 81–99)
MONOCYTES # BLD: 4.7 %
MONOCYTES ABSOLUTE: 0.8 THOU/MM3 (ref 0.4–1.3)
MRSA SCREEN: NORMAL
NUCLEATED RED BLOOD CELLS: 0 /100 WBC
PLATELET # BLD: 339 THOU/MM3 (ref 130–400)
PMV BLD AUTO: 10.5 FL (ref 9.4–12.4)
POTASSIUM REFLEX MAGNESIUM: 3.8 MEQ/L (ref 3.5–5.2)
RBC # BLD: 4.06 MILL/MM3 (ref 4.2–5.4)
RESPIRATORY CULTURE: NORMAL
RESPIRATORY CULTURE: NORMAL
SCLERODERMA (SCL-70) AB: < 0.6 U/ML
SEG NEUTROPHILS: 73.1 %
SEGMENTED NEUTROPHILS ABSOLUTE COUNT: 11.8 THOU/MM3 (ref 1.8–7.7)
SODIUM BLD-SCNC: 138 MEQ/L (ref 135–145)
TOTAL PROTEIN: 6.4 G/DL (ref 6.1–8)
URINE CULTURE, ROUTINE: NORMAL
WBC # BLD: 16.1 THOU/MM3 (ref 4.8–10.8)

## 2021-06-14 PROCEDURE — 6370000000 HC RX 637 (ALT 250 FOR IP): Performed by: INTERNAL MEDICINE

## 2021-06-14 PROCEDURE — 6360000002 HC RX W HCPCS: Performed by: INTERNAL MEDICINE

## 2021-06-14 PROCEDURE — 6370000000 HC RX 637 (ALT 250 FOR IP): Performed by: STUDENT IN AN ORGANIZED HEALTH CARE EDUCATION/TRAINING PROGRAM

## 2021-06-14 PROCEDURE — 87389 HIV-1 AG W/HIV-1&-2 AB AG IA: CPT

## 2021-06-14 PROCEDURE — 90792 PSYCH DIAG EVAL W/MED SRVCS: CPT | Performed by: PSYCHIATRY & NEUROLOGY

## 2021-06-14 PROCEDURE — 36415 COLL VENOUS BLD VENIPUNCTURE: CPT

## 2021-06-14 PROCEDURE — 6360000002 HC RX W HCPCS: Performed by: STUDENT IN AN ORGANIZED HEALTH CARE EDUCATION/TRAINING PROGRAM

## 2021-06-14 PROCEDURE — 2140000000 HC CCU INTERMEDIATE R&B

## 2021-06-14 PROCEDURE — 94761 N-INVAS EAR/PLS OXIMETRY MLT: CPT

## 2021-06-14 PROCEDURE — 85025 COMPLETE CBC W/AUTO DIFF WBC: CPT

## 2021-06-14 PROCEDURE — C9113 INJ PANTOPRAZOLE SODIUM, VIA: HCPCS | Performed by: STUDENT IN AN ORGANIZED HEALTH CARE EDUCATION/TRAINING PROGRAM

## 2021-06-14 PROCEDURE — 2580000003 HC RX 258: Performed by: INTERNAL MEDICINE

## 2021-06-14 PROCEDURE — 2500000003 HC RX 250 WO HCPCS: Performed by: FAMILY MEDICINE

## 2021-06-14 PROCEDURE — 99233 SBSQ HOSP IP/OBS HIGH 50: CPT | Performed by: INTERNAL MEDICINE

## 2021-06-14 PROCEDURE — 80053 COMPREHEN METABOLIC PANEL: CPT

## 2021-06-14 PROCEDURE — 2700000000 HC OXYGEN THERAPY PER DAY

## 2021-06-14 PROCEDURE — 2580000003 HC RX 258: Performed by: FAMILY MEDICINE

## 2021-06-14 RX ORDER — HYDROXYZINE HYDROCHLORIDE 25 MG/1
25 TABLET, FILM COATED ORAL 3 TIMES DAILY PRN
Status: DISCONTINUED | OUTPATIENT
Start: 2021-06-14 | End: 2021-06-15 | Stop reason: HOSPADM

## 2021-06-14 RX ORDER — GABAPENTIN 600 MG/1
600 TABLET ORAL 3 TIMES DAILY
Status: DISCONTINUED | OUTPATIENT
Start: 2021-06-14 | End: 2021-06-15 | Stop reason: HOSPADM

## 2021-06-14 RX ADMIN — PANTOPRAZOLE SODIUM 40 MG: 40 INJECTION, POWDER, FOR SOLUTION INTRAVENOUS at 08:27

## 2021-06-14 RX ADMIN — LAMOTRIGINE 50 MG: 25 TABLET ORAL at 08:16

## 2021-06-14 RX ADMIN — ONDANSETRON 4 MG: 2 INJECTION INTRAMUSCULAR; INTRAVENOUS at 19:39

## 2021-06-14 RX ADMIN — ACETAMINOPHEN 650 MG: 325 TABLET ORAL at 19:38

## 2021-06-14 RX ADMIN — LEVOFLOXACIN 750 MG: 5 INJECTION, SOLUTION INTRAVENOUS at 01:57

## 2021-06-14 RX ADMIN — SODIUM CHLORIDE, PRESERVATIVE FREE 10 ML: 5 INJECTION INTRAVENOUS at 08:35

## 2021-06-14 RX ADMIN — DEXMEDETOMIDINE 0.9 MCG/KG/HR: 100 INJECTION, SOLUTION, CONCENTRATE INTRAVENOUS at 03:52

## 2021-06-14 RX ADMIN — GABAPENTIN 300 MG: 600 TABLET, FILM COATED ORAL at 08:16

## 2021-06-14 RX ADMIN — VENLAFAXINE 50 MG: 50 TABLET ORAL at 19:39

## 2021-06-14 RX ADMIN — BUPRENORPHINE AND NALOXONE 1 FILM: 8; 2 FILM BUCCAL; SUBLINGUAL at 19:39

## 2021-06-14 RX ADMIN — SODIUM CHLORIDE, PRESERVATIVE FREE 10 ML: 5 INJECTION INTRAVENOUS at 19:39

## 2021-06-14 RX ADMIN — FENTANYL CITRATE 50 MCG: 50 INJECTION, SOLUTION INTRAMUSCULAR; INTRAVENOUS at 23:03

## 2021-06-14 RX ADMIN — CEFEPIME HYDROCHLORIDE 2000 MG: 2 INJECTION, POWDER, FOR SOLUTION INTRAVENOUS at 00:26

## 2021-06-14 RX ADMIN — DILTIAZEM HYDROCHLORIDE 30 MG: 30 TABLET, FILM COATED ORAL at 23:03

## 2021-06-14 RX ADMIN — SPIRONOLACTONE 25 MG: 25 TABLET ORAL at 08:18

## 2021-06-14 RX ADMIN — CEFEPIME HYDROCHLORIDE 2000 MG: 2 INJECTION, POWDER, FOR SOLUTION INTRAVENOUS at 08:27

## 2021-06-14 RX ADMIN — VENLAFAXINE 50 MG: 50 TABLET ORAL at 14:28

## 2021-06-14 RX ADMIN — LAMOTRIGINE 50 MG: 25 TABLET ORAL at 19:38

## 2021-06-14 RX ADMIN — ACETAMINOPHEN 650 MG: 325 TABLET ORAL at 03:34

## 2021-06-14 RX ADMIN — ENOXAPARIN SODIUM 40 MG: 40 INJECTION SUBCUTANEOUS at 08:35

## 2021-06-14 RX ADMIN — PROPRANOLOL HYDROCHLORIDE 60 MG: 20 TABLET ORAL at 19:39

## 2021-06-14 RX ADMIN — GABAPENTIN 600 MG: 600 TABLET, FILM COATED ORAL at 19:39

## 2021-06-14 RX ADMIN — ACETAMINOPHEN 650 MG: 325 TABLET ORAL at 12:21

## 2021-06-14 RX ADMIN — HYDROXYZINE HYDROCHLORIDE 25 MG: 25 TABLET ORAL at 17:19

## 2021-06-14 RX ADMIN — PROPRANOLOL HYDROCHLORIDE 60 MG: 20 TABLET ORAL at 14:29

## 2021-06-14 RX ADMIN — DEXMEDETOMIDINE 0.9 MCG/KG/HR: 100 INJECTION, SOLUTION, CONCENTRATE INTRAVENOUS at 10:26

## 2021-06-14 RX ADMIN — BUPRENORPHINE AND NALOXONE 1 FILM: 8; 2 FILM BUCCAL; SUBLINGUAL at 08:19

## 2021-06-14 RX ADMIN — ARIPIPRAZOLE 5 MG: 5 TABLET ORAL at 08:18

## 2021-06-14 RX ADMIN — VENLAFAXINE 50 MG: 50 TABLET ORAL at 08:16

## 2021-06-14 RX ADMIN — GABAPENTIN 300 MG: 600 TABLET, FILM COATED ORAL at 14:29

## 2021-06-14 RX ADMIN — METHYLPREDNISOLONE SODIUM SUCCINATE 40 MG: 40 INJECTION, POWDER, FOR SOLUTION INTRAMUSCULAR; INTRAVENOUS at 08:24

## 2021-06-14 ASSESSMENT — ENCOUNTER SYMPTOMS
VOMITING: 0
ABDOMINAL PAIN: 0
SHORTNESS OF BREATH: 1
NAUSEA: 0
COLOR CHANGE: 0
CHEST TIGHTNESS: 0
BACK PAIN: 0
COUGH: 0
EYE REDNESS: 0
WHEEZING: 0
ABDOMINAL DISTENTION: 0
VOICE CHANGE: 0

## 2021-06-14 ASSESSMENT — PAIN DESCRIPTION - ONSET
ONSET: ON-GOING
ONSET: ON-GOING
ONSET: GRADUAL

## 2021-06-14 ASSESSMENT — PAIN DESCRIPTION - PAIN TYPE
TYPE: ACUTE PAIN

## 2021-06-14 ASSESSMENT — PAIN DESCRIPTION - LOCATION
LOCATION: BACK
LOCATION: BACK
LOCATION: GENERALIZED

## 2021-06-14 ASSESSMENT — PAIN DESCRIPTION - FREQUENCY
FREQUENCY: INTERMITTENT
FREQUENCY: CONTINUOUS
FREQUENCY: CONTINUOUS

## 2021-06-14 ASSESSMENT — PAIN SCALES - GENERAL
PAINLEVEL_OUTOF10: 0
PAINLEVEL_OUTOF10: 9
PAINLEVEL_OUTOF10: 0
PAINLEVEL_OUTOF10: 8
PAINLEVEL_OUTOF10: 8
PAINLEVEL_OUTOF10: 0
PAINLEVEL_OUTOF10: 8
PAINLEVEL_OUTOF10: 4

## 2021-06-14 ASSESSMENT — PAIN DESCRIPTION - ORIENTATION
ORIENTATION: OTHER (COMMENT)
ORIENTATION: OTHER (COMMENT)

## 2021-06-14 ASSESSMENT — PAIN DESCRIPTION - DESCRIPTORS
DESCRIPTORS: ACHING;SORE
DESCRIPTORS: ACHING;SORE
DESCRIPTORS: ACHING;DISCOMFORT

## 2021-06-14 ASSESSMENT — PAIN DESCRIPTION - PROGRESSION
CLINICAL_PROGRESSION: NOT CHANGED
CLINICAL_PROGRESSION: NOT CHANGED
CLINICAL_PROGRESSION: GRADUALLY WORSENING

## 2021-06-14 ASSESSMENT — PAIN - FUNCTIONAL ASSESSMENT
PAIN_FUNCTIONAL_ASSESSMENT: ACTIVITIES ARE NOT PREVENTED

## 2021-06-14 NOTE — PROGRESS NOTES
to 16.1. Infectious work-up including blood cultures, respiratory culture, fungus culture, respiratory viral culture, urine culture all negative to date. Patient is maintained afebrile. Pro-Sidney was negative on admission. CT with diffuse bilateral groundglass opacities, and chest x-ray with bilateral diffuse infiltrates. More likely a noninfectious process. Stopping antibiotics as above. Cryptogenic organizing pneumonia, history of: Previously diagnosed with cryptogenic organizing pneumonia/bronchiolitis obliterans with organizing pneumonia based upon biopsy at [facility]. S/p VATS with wedge biopsy in 2018. Anxiety: Pt endorses anxiety, irritability. Stop precedex drip per psych rec's. Also do not recommend benzodiazepines. Psych consulted for assistance. Meds were adjusted: gabapentin increased, added atarax as needed for breakthrough anxiety. Continued Effexor but changed from 50mg TID to 150mg XR in the morning. Lamictal, abilify and propranolol were continued. Psych following. Drug abuse: Urine drug screen on admission reported presented positive for benzodiazepines, opiates, amphetamines, PCP. Patient does have a noted history of drug abuse, and had reportedly been sober for 13 years. Chronic diastolic HFpEF: ECHO with EF 50-55% on 6/10/21with moderate TR. Appears euvolemic. Strict I/O, daily weights. Net even since admission. Net-14 lbs since admission. Pulmonary HTN, history of: ECHO 4/2019 estimated RVSP=53mmHg. Did not see right heart cath done. Acute ground-glass opacities argue that this is not the cause of her acute resp failure. Idiopathic intracranial HTN: LP on 6/9/21 with opening pressure=26mm. 10mL CSF removed for therapeutic relief. Diltiazem, propanolol with holding parameters. Seizure disorder, history of: EEG 6/9/21 reported as with 7 to 8Hz waveforms with mild generalized slowing without epileptiform features. Continue lamictal.  Tobacco abuse, history of: chronic. 1 ppd use. Continues to smoke. Nicotine patch continued. CC:  dyspnea  HPI: Irma Mercer is a 39 y/o female active-smoker with PMH of chronic respiratory failure with 1-2 home O2 use, drug/alcohol abuse, anxiety, IBS, depression, idiopathic intracranial HTN, diastolic heart failure, wedge biopsy in 2018, and cryptogenic organizing pneumonia. Patient presented to [facility] on 6/11/21 from outlying facility with complaints of worsening dyspnea. Of note, it was reported that at home she has continued to chain smoke despite being advised otherwise by her pulmonologist. She endorsed hx of intravenous drug abuse but quit 13 years ago. Per OSH report, she has poor medication/medical compliance. Per report, she was noted to have a leukocytosis, along with ground glass opacities on imaging on 6/8/21. Patient was administered ceftriaxone and steroids with minimal improvement. She was requiring supplemental oxygen. Patient left AMA from [facility] and transferred to [facility]. The patient stayed two days there without clinical improvement and subsequently transferred to [facility] for further care and management. Upon arrival patient was admitted by hospital service. See H&P for further details. She arrived on HF NC support 40 lpm 100% FiO2. She transitioned to BiPAP. The patient was transferred to the ICU further worsening work of breathing and increased respiratory distress with hypoxia. The patient was intubated, sedated, and mechanically ventilated. Bronchoscopy was done without any reported sign of pneumonia. BAL was negative. Steroid therapy was continued. Patient did self-extubate on 6/13/21. She was placed on BiPAP and did well with that with precedex infusing. She was weaned since then to room air. For further details, please see assessment and plan. ROS: Review of Systems   Constitutional: Negative for chills, diaphoresis and fever. HENT: Negative for congestion, hearing loss and voice change.     Eyes: Nontender. Extremities:  No clubbing, cyanosis, or edema x 4. Vasculature: capillary refill < 3 seconds. Palpable dorsalis pedis pulses. Skin:  warm and dry. Psych:  Alert and oriented x3. Visibly anxious and irritable. Impulsive. Cooperative. Affect appropriate  Lymph:  No supraclavicular adenopathy. Neurologic:  No focal deficit. No seizures. Purposeful. Follows commands. Data: (All radiographs, tracings, PFTs, and imaging are personally viewed and interpreted unless otherwise noted). Telemetry: NSR. Rate 64. No ectopy. Sodium 138 potassium 3.8 chloride 102 CO2 29 BUN 24 creatinine 0.5 glucose 119 calcium 9.8  Procalcitonin 0.10 (6/11)  Albumin 3.5 alk phos 90 ALT 9 AST 11 bilirubin 0.2 total protein 6.4  WBC 16.1 hemoglobin 12.1 hematocrit 38.8 platelets 012  Urine drug screen 6/11: Presumed positive for benzodiazepines, opiates, amphetamine, PCP  Blood culture 1 6/10: preliminary no growth  Blood culture 2 6/10: preliminary no growth  Respiratory culture 6/12: preliminary normal karrie  Fungus culture 6/12: No fungus isolated  Respiratory viral culture 6/12: in process  Molecular pneumonia panel 6/11: negative   HIV Ag/Ab 6/14: nonreactive  Urine legionella antigen 6/12: negative  Urine culture 6/12: preliminary no growth  MRSA screen 6/11: negative  Rheumatoid factor (6/12): 21  BAL cell count/diff 6/12/21: 4% macrophages, 5% lymphocytes, 10% segmented neutrophils, 0% eosinophils. CT chest wo contrast 6/11/21 report: Diffuse multilobar airspace disease. Differential consideration includes multilobar pneumonia of uncertain etiology, ARDS, pulmonary edema or pulmonary alveolar proteinosis. Drug-induced pneumonitis also consideration. CXR 6/12/21 report: There is extensive bilateral pulmonary opacification. There is borderline cardiomegaly. Case discussed with Dr. Dorinda Burns. Electronically signed by DAYANARA Contreras                                     Patient seen by me.   Case discussed with nurse practitioner. Continue with systemic steroids.     Electronically signed by Magdalena Cartagena MD on 6/15/2021 at 9:34 AM

## 2021-06-14 NOTE — SIGNIFICANT EVENT
Patient seen by me. Case discussed with nurse practitioner. Case reviewed with Dr. Christina Sexton. CT scan chest reviewed with diffuse bilateral infiltrates. 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 Continue with systemic steroids. Discontinue antibiotics. Patient with severe anxiety. Appreciate psychiatry's assistance. Medications increased. Discontinue Precedex. Time was discontiguous. Time does not include procedures. Time does include my direct assessment of the patient and coordination of care.   Electronically signed by Lauren Magaña MD on 6/14/2021 at 4:42 PM

## 2021-06-14 NOTE — PLAN OF CARE
Problem: Nutrition  Goal: Optimal nutrition therapy  Outcome: Ongoing   Nutrition Problem #1: Inadequate oral intake  Intervention: Food and/or Nutrient Delivery:  (diet start per Dr as pt able )  Nutritional Goals: 75% or more of FL or advanced diet with safe swallow during LOS

## 2021-06-14 NOTE — PROGRESS NOTES
Pharmacy Medication History Note      List of current medications patient is taking is complete. Source of information: patient, epic med list, [email or web address] [facility]    Changes made to medication list:  Medications removed (include reason, ex. therapy complete or physician discontinued):  N/A    Medications added/doses adjusted:  Lamotrigine 50 mg BID changed to 50 mg daily  Meclizine 12.5 mg TID PRN changed to 12.5 mg daily PRN  Pantoprazole 40 mg BID changed to 40 mg daily    Other notes (ex. Recent course of antibiotics, Coumadin dosing):  Patient was coming off of sedation and could not remember exactly when she took her last doses, but assured me that she takes them on a regular basis and fill history indicates the same. Denies use of other OTC or herbal medications.       Allergies reviewed: chlorhexidine      Electronically signed by Ysabel Garcia on 6/14/2021 at 12:37 PM

## 2021-06-14 NOTE — CARE COORDINATION
6/14/21, 8:16 AM EDT  DISCHARGE PLANNING EVALUATION:    Christ Tanner       Admitted: 6/11/2021/ [facility] day: 3   Location: 4D-02/002-A Reason for admit: Acute respiratory failure ([facility] [J96.00]  Acute respiratory failure ([facility] [J96.00]   PMH:  has a past medical history of Alcohol abuse, Anxiety, Asthma, Back pain, Cryptogenic organizing pneumonia ([address] Depression, Drug abuse ([address] Heart failure, diastolic, due to CAD ([address] Hypertension, Irritable bowel syndrome, Pneumonia, Pseudotumor, and Seizure ([address] Procedure:   6/11 CXR: Bilateral consolidation, question pulmonary edema versus pneumonia  6/11 Interpretation outside films of CT Chest: Diffuse bilateral groundglass consolidation, possibly pneumonia; Mildly enlarged mediastinal nodes, likely reactive  6/12 Intubated  6/12 Bronch w/washings sent  6/12 CXR: extensive bilateral pulmonary opacification; borderline cardiomegaly  6/13 Extubated    Barriers to Discharge: Initially presented to [facility] with elevated SBC, and ground glass opacities on 6/8/21. She signed out AMA and went to [facility] and was admitted. She remained hypoxic. Transferred to [address] [place] [address] and admitted to [facility]. Bilateral multilobar pneumonia. Tox screen with +benzos, opiates, PCP, and methamphetamine; patient denies use, did received narcotics at [facility]. Was on Suboxone PTA. Initially on HFNC, transitioned to bipap. Pulmonology consulted. Increased WOB and respiratory distress; transferred to ICU on 6/12 and was intubated and had bronch. Extubated on 6/13. Psychiatry consulted today for severe anxiety. Afebrile. NSR. Sats 95% on 2L O2. Oriented to person and place. Follows commands. Sitter at bedside. Dietitian consulted. Gayatri. Precedex @ 0.9 mcg/kg/hr, abilify, suboxone, IV cefepime, IV levaquin, cardizem, lovenox, neurontin, lamictal, IV solumedrol 40 mg daily, nicotine patch, protonix, inderal, aldactone, effexor. Procal 0.10, trop neg, wbc 33 - now 16.1.  Blood, respiratory, and urine cultures sent. PCP: Carol Vazquez MD  Readmission Risk Score: 21%    Patient Goals/Plan/Treatment Preferences: Spoke with Christ; states she lives at home alone and uses a walker. She states she wears 2L O2 PRN; unable to recall DME company that provides O2. She did not have any HH services PTA. She cares for herself independently. She does not drive, she takes a cab where she needs to go, and has a PCP. Christ states she plans to return home at discharge, denies needs, and declines HH. Transportation/Food Security/Housekeeping Addressed:  No issues identified. Patient was not a readmit; was at [facility], left AMA, then went to [facility] where she remained until transfer to [facility].

## 2021-06-14 NOTE — PROGRESS NOTES
buprenorphine-naloxone (SUBOXONE) 8-2 MG FILM SL film Place 1 each under the tongue 2 times daily.    Yes Historical Provider, MD   hydrOXYzine (VISTARIL) 25 MG capsule Take 25 mg by mouth 3 times daily as needed for Itching    Historical Provider, MD   pantoprazole (PROTONIX) 40 MG tablet Take 40 mg by mouth 2 times daily    Historical Provider, MD   meclizine (ANTIVERT) 12.5 MG tablet Take 12.5 mg by mouth 3 times daily as needed    Historical Provider, MD   ARIPiprazole (ABILIFY) 5 MG tablet Take 5 mg by mouth daily    Historical Provider, MD   venlafaxine (EFFEXOR XR) 150 MG extended release capsule Take 150 mg by mouth every morning 2 tablets    Historical Provider, MD   lamoTRIgine (LAMICTAL) 25 MG tablet Take 50 mg by mouth 2 times daily 2 tablets BID    Historical Provider, MD   spironolactone (ALDACTONE) 25 MG tablet 1 tablet daily 5/4/21   Historical Provider, MD   dilTIAZem (CARDIZEM CD) 120 MG extended release capsule Take 1 capsule by mouth daily 5/12/21   Historical Provider, MD   propranolol (INDERAL) 60 MG tablet Take 1 tablet by mouth 3 times daily 3/17/21   Historical Provider, MD   valsartan (DIOVAN) 320 MG tablet Take 1 tablet by mouth daily 5/12/21   Historical Provider, MD   albuterol sulfate  (90 Base) MCG/ACT inhaler Inhale 1 puff into the lungs every 6 hours as needed 12/15/20 12/15/21  Historical Provider, MD        Medications:    Current Facility-Administered Medications: dexmedetomidine (PRECEDEX) 400 mcg in sodium chloride 0.9 % 100 mL infusion, 0.2-1.4 mcg/kg/hr, Intravenous, Continuous  lamoTRIgine (LAMICTAL) tablet 50 mg, 50 mg, Oral, BID  nicotine (NICODERM CQ) 14 MG/24HR 1 patch, 1 patch, Transdermal, Daily  gabapentin (NEURONTIN) tablet 300 mg, 300 mg, Oral, TID  propranolol (INDERAL) tablet 60 mg, 60 mg, Oral, TID  calcium replacement protocol, , Other, RX Placeholder  pantoprazole (PROTONIX) injection 40 mg, 40 mg, Intravenous, Daily  venlafaxine (EFFEXOR) tablet 50 mg, 50 mg, Oral, TID  dilTIAZem (CARDIZEM) tablet 30 mg, 30 mg, Oral, 4 times per day  methylPREDNISolone sodium (SOLU-MEDROL) injection 40 mg, 40 mg, Intravenous, Daily  sodium chloride flush 0.9 % injection 5-40 mL, 5-40 mL, Intravenous, 2 times per day  cefepime (MAXIPIME) 2000 mg IVPB minibag, 2,000 mg, Intravenous, Q8H **AND** levoFLOXacin (LEVAQUIN) 750 MG/150ML infusion 750 mg, 750 mg, Intravenous, Q24H  fentaNYL (SUBLIMAZE) injection 50 mcg, 50 mcg, Intravenous, Q3H PRN  albuterol sulfate  (90 Base) MCG/ACT inhaler 1 puff, 1 puff, Inhalation, Q6H PRN  ARIPiprazole (ABILIFY) tablet 5 mg, 5 mg, Oral, Daily  buprenorphine-naloxone (SUBOXONE) 8-2 MG SL film 1 Film, 1 Film, Sublingual, BID  spironolactone (ALDACTONE) tablet 25 mg, 25 mg, Oral, Daily  [Held by provider] valsartan (DIOVAN) tablet 320 mg, 320 mg, Oral, Daily  sodium chloride flush 0.9 % injection 5-40 mL, 5-40 mL, Intravenous, PRN  0.9 % sodium chloride infusion, 25 mL, Intravenous, PRN  enoxaparin (LOVENOX) injection 40 mg, 40 mg, Subcutaneous, Daily  ondansetron (ZOFRAN-ODT) disintegrating tablet 4 mg, 4 mg, Oral, Q8H PRN **OR** ondansetron (ZOFRAN) injection 4 mg, 4 mg, Intravenous, Q6H PRN  polyethylene glycol (GLYCOLAX) packet 17 g, 17 g, Oral, Daily PRN  acetaminophen (TYLENOL) tablet 650 mg, 650 mg, Oral, Q6H PRN **OR** acetaminophen (TYLENOL) suppository 650 mg, 650 mg, Rectal, Q6H PRN     Past Medical History:        Diagnosis Date    Alcohol abuse     Anxiety     Asthma     Back pain     [facility]     Depression     Drug abuse ([facility]     Heart failure, diastolic, due to CAD ([facility]     Hypertension     Irritable bowel syndrome     Pneumonia     Pseudotumor     Seizure ([address]        Past Surgical History:        Procedure Laterality Date    BRONCHOSCOPY      CARDIAC CATHETERIZATION Left     CARDIOVASCULAR STRESS TEST       SECTION      FOOT SURGERY      NASAL SEPTUM SURGERY      SINUS SURGERY      THORACOSCOPY      right chest - VATS 2018    TONSILLECTOMY AND ADENOIDECTOMY      UPPER GASTROINTESTINAL ENDOSCOPY      UPPER GASTROINTESTINAL ENDOSCOPY      WISDOM TOOTH EXTRACTION         Allergies: Chlorhexidine gluconate      Social History:      RESIDENCE: Born and raised in [facility]. Currently lives in [place] with her 3 children   : Single    CHILDREN: 3  OCCUPATION: Unemployed. She last worked at [employer] in February 2021 has not been gainfully employed for the last 4 months  EDUCATION: high school graduate    SUBSTANCE USE HISTORY: Smokes 1 PPD. She has a history of cocaine and IV opiate abuse as well as cannabis abuse. Has been sober for 13 years. Is currently on Suboxone which is managed by Dr. Carly Burks in [place]. She denies recent alcohol use. Family Medical and Psychiatric History: Mother depression  Mother and father alcohol abuse         Problem Relation Age of Onset    Alcohol Abuse Mother     Asthma Mother     Depression Mother     High Blood Pressure Mother     Alcohol Abuse Father     High Blood Pressure Father     Asthma Maternal Grandmother     Breast Cancer Maternal Grandmother     High Blood Pressure Maternal Grandmother          Physical  BP (!) 118/90   Pulse 61   Temp 97.3 °F (36.3 °C) (Bladder)   Resp 18   Ht 5' (1.524 m)   Wt 167 lb 5.3 oz (75.9 kg)   LMP  (LMP Unknown)   SpO2 95%   BMI 32.68 kg/m²     General: On BiPAP. In no significant distress. Obese, ill-appearing. Eyes: PERRL. No sclera icterus. No conjunctival injection. ENT: No discharge. Neck: Trachea midline. Normal thyroid. Resp: No accessory muscle use. Decreased breath sounds bilaterally, crackles. CV: Regular rate. Regular rhythm. No mumur or rub. No edema. Abd: Non-tender. Non-distended. No masses. No organmegaly. Normal bowel sounds. No hernia. Skin: Warm and dry. No nodule on exposed extremities. No rash on exposed extremities. Lymph: No cervical LAD.  No supraclavicular LAD. M/S: No cyanosis. No joint deformity. No clubbing. Neuro: Awake. Follows commands. Mental Status Examination:  Level of consciousness:  Within normal limits  Appearance: obese, hospital attire, lying in bed, fair grooming  Behavior/Motor:  tearful  Attitude toward examiner:  cooperative, attentive and fair eye contact  Speech:  Spontaneous, normal rate and volume  Mood:  Anxious, dysphoric  Affect: mood congruent  Thought processes:  Linear, goal directed, coherent  Thought content: denies suicidal ideations   denies homicidal ideations    denies hallucinations   denies delusions  Cognition:  Oriented to self, situation, location, date  Concentration clinically adequate  Memory age appropriate  Insight & Judgment:  fair    DSM-5 DIAGNOSIS:      Generalized anxiety disorder  Major depressive disorder, recurrent, moderate episode  History of polysubstance abuse     General Medical Condition  Patient Active Problem List   Diagnosis Code    Aspiration pneumonia due to regurgitated food ([address] J69.0    Pneumonia J18.9    Idiopathic intracranial hypertension G93.2    Seizure disorder ([address] G40.909    Severe obesity (BMI 35.0-35.9 with comorbidity) ([address] E66.01, Z68.35    Hypoxemia R09.02    Chronic heart failure with preserved ejection fraction ([address] I50.32    Acute respiratory failure ([address] J96.00       Stressors     Severity of stressors is moderate  Source of stressors include: Other psychosocial and environmental stressors    RECOMMENDATIONS:    Increase Gabapentin to 600mg TID. Patients home dose is 1200mg TID. OARRS verified. Last filled Gabapentin 1200mg TID on 06/07/2021. 180 tablets for a 30 day supply   Change Effexor from 50mg TID to 150mg XR in the morning  Add Atarax 50mg TID PRN for breakthrough anxiety  Do not recommend Precedex or benzodiazepines at this time  Continue with Lamictal, Abilify and Propanolol  Additional recommendations will follow the clinical course. Thank you very much for allowing us to participate in the care of this patient. Time spent 45 min.       Electronically signed by Ricardo Eisenmenger, PA-C on 6/14/21 at 8:38 AM EDT

## 2021-06-14 NOTE — PROGRESS NOTES
Comprehensive Nutrition Assessment    Type and Reason for Visit:  Reassess    Nutrition Recommendations/Plan:   Pt currently  NPO. Diet start per Dr as pt able. Continue to monitor weights   Nutrition Assessment:     Pt. nutritionally compromised AEB NPO status, extubated 6/13, but on precedex curretnly. At risk for further nutrition compromise r/t admit with Acute Respiratory Failure, Bilateral Multilobar Pneumonia and underlying medical condition (Hx Cryptogenic Organizing Pneumonia, IV Drug Use, Poor Medical Compliance, ILD, Anxiety, Morbid Obesity, Chronic Pain Narcotic Abuse, Tobacco Abuse ). Nutrition recommendations/interventions as per above. Malnutrition Assessment:  Malnutrition Status: At risk for malnutrition (Comment)    Context:  Acute Illness     Findings of the 6 clinical characteristics of malnutrition:  Energy Intake:  Unable to assess  Weight Loss:  No significant weight loss     Body Fat Loss:  Unable to assess     Muscle Mass Loss:  Unable to assess    Fluid Accumulation:  No significant fluid accumulation     Strength:  Not Performed    Estimated Daily Nutrient Needs:  Energy (kcal):  ~1208kcals ( ~14kcals/kgm); Weight Used for Energy Requirements:   (86.3kgm ( 6/12))     Protein (g):  ~91 grams ( ~ 2 grams protein/kgm IBW); Weight Used for Protein Requirements:  Ideal (IBW 45kgm)        Fluid (ml/day):  per MD;          Nutrition Related Findings:  Pt self extubated 6/13, NPO, receiving precedex, sitter at side,psychiatry has seen per RN. Per Sarkis GONZALEZ pt is NPO, may do bronch, swallowed meds fine. Glucose 119, WBC 16. 1. meds include solumedrol, fentanyl.      Wounds:  None       Current Nutrition Therapies:    NPO    Anthropometric Measures:  · Height: 5' (152.4 cm)  · Current Body Weight: 167 lb 5.3 oz (75.9 kg) (6/14)   · Admission Body Weight: 181 lb 8 oz (82.3 kg) ((6/11) no edema)    · Usual Body Weight:  (per EMR: (6/10/21) 183# bedscale)     · Ideal Body Weight: 100 lbs;

## 2021-06-14 NOTE — CONSULTS
Department of Psychiatry  Consult Service   Psychiatric Assessment        Thank you very much for allowing us to participate in the care of this patient.       Reason for Consult:  Anxiety     HISTORY OF PRESENT ILLNESS:           The patient is a 38 y.o. female with significant history of drug/alcohol abuse, anxiety, IBS, depression, idiopathic intracranial HTN, diastolic heart failure, and cryptogenic organizing pneumonia who is admitted medically for multilobar pneumonia. Per the chart review, she initially presented to [facility] with elevated white blood cell count of 33,000 as well as groundglass opacities on June 8, 2021.  She was started on ceftriaxone and Rocephin at [facility] as well as steroids with minimal improvement.  It was reported that at home, she has continued to chain smoke despite being advised otherwise by her pulmonologist. She endorse hx of intravenous drug abuse but quit 13 years ago. Per OSH report, she has poor medication/medical compliance and report that she requested to be transferred to [facility] where she had been for two days. Patient was recommended transfer to [facility] after showing poor clinical progress. Records shows that patient remained hypoxic with oxygen dropping to 70s on attempt to wean off oxygen. Patient CT chest showed cryptogenic organizing pneumonia and has been on three antibiotics including clindamycin, cefepime and Levaquin. Records also indicate patient has Diastolic CHF with moderate tricuspid regurgitation with elevated R- ventricular systolic pressure of [id number] suggestive of PH. Recently, patient has been following with Pulmonary service and reports being told that she may have severe acid reflux that is leading to ILD. She does use 1-2 L oxygen at home as needed.     Psychiatry was consulted for anxiety management.      Patient is seen laying in bed on BIPAP. Sitter is present at bedside.  Patient reports she has been feeling very anxious since being each under the tongue 2 times daily.     Yes Historical Provider, MD   hydrOXYzine (VISTARIL) 25 MG capsule Take 25 mg by mouth 3 times daily as needed for Itching       Historical Provider, MD   pantoprazole (PROTONIX) 40 MG tablet Take 40 mg by mouth 2 times daily       Historical Provider, MD   meclizine (ANTIVERT) 12.5 MG tablet Take 12.5 mg by mouth 3 times daily as needed       Historical Provider, MD   ARIPiprazole (ABILIFY) 5 MG tablet Take 5 mg by mouth daily       Historical Provider, MD   venlafaxine (EFFEXOR XR) 150 MG extended release capsule Take 150 mg by mouth every morning 2 tablets       Historical Provider, MD   lamoTRIgine (LAMICTAL) 25 MG tablet Take 50 mg by mouth 2 times daily 2 tablets BID       Historical Provider, MD   spironolactone (ALDACTONE) 25 MG tablet 1 tablet daily 5/4/21     Historical Provider, MD   dilTIAZem (CARDIZEM CD) 120 MG extended release capsule Take 1 capsule by mouth daily 5/12/21     Historical Provider, MD   propranolol (INDERAL) 60 MG tablet Take 1 tablet by mouth 3 times daily 3/17/21     Historical Provider, MD   valsartan (DIOVAN) 320 MG tablet Take 1 tablet by mouth daily 5/12/21     Historical Provider, MD   albuterol sulfate  (90 Base) MCG/ACT inhaler Inhale 1 puff into the lungs every 6 hours as needed 12/15/20 12/15/21   Historical Provider, MD            Medications:      Current Hospital Medications   Current Facility-Administered Medications: dexmedetomidine (PRECEDEX) 400 mcg in sodium chloride 0.9 % 100 mL infusion, 0.2-1.4 mcg/kg/hr, Intravenous, Continuous  lamoTRIgine (LAMICTAL) tablet 50 mg, 50 mg, Oral, BID  nicotine (NICODERM CQ) 14 MG/24HR 1 patch, 1 patch, Transdermal, Daily  gabapentin (NEURONTIN) tablet 300 mg, 300 mg, Oral, TID  propranolol (INDERAL) tablet 60 mg, 60 mg, Oral, TID  calcium replacement protocol, , Other, RX Placeholder  pantoprazole (PROTONIX) injection 40 mg, 40 mg, Intravenous, Daily  venlafaxine (EFFEXOR) tablet 50 mg, 50 mg, Oral, TID  dilTIAZem (CARDIZEM) tablet 30 mg, 30 mg, Oral, 4 times per day  methylPREDNISolone sodium (SOLU-MEDROL) injection 40 mg, 40 mg, Intravenous, Daily  sodium chloride flush 0.9 % injection 5-40 mL, 5-40 mL, Intravenous, 2 times per day  cefepime (MAXIPIME) 2000 mg IVPB minibag, 2,000 mg, Intravenous, Q8H **AND** levoFLOXacin (LEVAQUIN) 750 MG/150ML infusion 750 mg, 750 mg, Intravenous, Q24H  fentaNYL (SUBLIMAZE) injection 50 mcg, 50 mcg, Intravenous, Q3H PRN  albuterol sulfate  (90 Base) MCG/ACT inhaler 1 puff, 1 puff, Inhalation, Q6H PRN  ARIPiprazole (ABILIFY) tablet 5 mg, 5 mg, Oral, Daily  buprenorphine-naloxone (SUBOXONE) 8-2 MG SL film 1 Film, 1 Film, Sublingual, BID  spironolactone (ALDACTONE) tablet 25 mg, 25 mg, Oral, Daily  [Held by provider] valsartan (DIOVAN) tablet 320 mg, 320 mg, Oral, Daily  sodium chloride flush 0.9 % injection 5-40 mL, 5-40 mL, Intravenous, PRN  0.9 % sodium chloride infusion, 25 mL, Intravenous, PRN  enoxaparin (LOVENOX) injection 40 mg, 40 mg, Subcutaneous, Daily  ondansetron (ZOFRAN-ODT) disintegrating tablet 4 mg, 4 mg, Oral, Q8H PRN **OR** ondansetron (ZOFRAN) injection 4 mg, 4 mg, Intravenous, Q6H PRN  polyethylene glycol (GLYCOLAX) packet 17 g, 17 g, Oral, Daily PRN  acetaminophen (TYLENOL) tablet 650 mg, 650 mg, Oral, Q6H PRN **OR** acetaminophen (TYLENOL) suppository 650 mg, 650 mg, Rectal, Q6H PRN         Past Medical History:    Past Medical History             Diagnosis Date    Alcohol abuse      Anxiety      Asthma      Back pain      [facility]ogenic [facility]      Depression      Drug abuse ([facility]      Heart failure, diastolic, due to CAD (H[facility])      Hypertension      Irritable bowel syndrome      Pneumonia      Pseudotumor      Seizure ([address]              Past Surgical History:    Past Surgical History             Procedure Laterality Date    BRONCHOSCOPY        CARDIAC CATHETERIZATION Left      CARDIOVASCULAR STRESS TEST         SECTION        FOOT SURGERY        NASAL SEPTUM SURGERY        SINUS SURGERY        THORACOSCOPY         right chest - VATS 2018    TONSILLECTOMY AND ADENOIDECTOMY        UPPER GASTROINTESTINAL ENDOSCOPY        UPPER GASTROINTESTINAL ENDOSCOPY        WISDOM TOOTH EXTRACTION                Allergies: Chlorhexidine gluconate       Social History:       RESIDENCE: [place]. Currently lives in [place] with her 3 children   : Single                     CHILDREN: 3  OCCUPATION: Unemployed. She last worked at [employer] in 2021 has not been gainfully employed for the last 4 months  EDUCATION: high school graduate     SUBSTANCE USE HISTORY: Smokes 1 PPD. She has a history of cocaine and IV opiate abuse as well as cannabis abuse. Has been sober for 13 years. Is currently on Suboxone which is managed by Dr. Ann Jackson in [place]. She denies recent alcohol use.            Family Medical and Psychiatric History:      Mother depression  Mother and father alcohol abuse      Family History             Problem Relation Age of Onset    Alcohol Abuse Mother      Asthma Mother      Depression Mother      High Blood Pressure Mother      Alcohol Abuse Father      High Blood Pressure Father      Asthma Maternal Grandmother      Breast Cancer Maternal Grandmother      High Blood Pressure Maternal Grandmother                 Physical  BP (!) 118/90   Pulse 61   Temp 97.3 °F (36.3 °C) (Bladder)   Resp 18   Ht 5' (1.524 m)   Wt 167 lb 5.3 oz (75.9 kg)   LMP  (LMP Unknown)   SpO2 95%   BMI 32.68 kg/m²      General: On BiPAP.  In no significant distress.  Obese, ill-appearing. Eyes: PERRL. No sclera icterus. No conjunctival injection. ENT: No discharge. Neck: Trachea midline.  Normal thyroid.      Mental Status Examination:  Level of consciousness:  Within normal limits  Appearance: obese, hospital attire, lying in bed, fair grooming  Behavior/Motor: tearful  Attitude toward examiner:  cooperative, attentive and fair eye contact  Speech:  Spontaneous, normal rate and volume  Mood:  Anxious, dysphoric  Affect: mood congruent  Thought processes:  Linear, goal directed, coherent  Thought content: denies suicidal ideations              denies homicidal ideations               denies hallucinations              denies delusions  Cognition:  Oriented to self, situation, location, date  Concentration clinically adequate  Memory age appropriate  Insight & Judgment:  fair     DSM-5 DIAGNOSIS:       Generalized anxiety disorder  Major depressive disorder, recurrent, moderate episode  History of polysubstance abuse      General Medical Condition       Patient Active Problem List   Diagnosis Code    Aspiration pneumonia due to regurgitated food ([facility] J69.0    Pneumonia J18.9    Idiopathic intracranial hypertension G93.2    Seizure disorder ([address] G40.909    Severe obesity (BMI 35.0-35.9 with comorbidity) ([facility] E66.01, Z68.35    Hypoxemia R09.02    Chronic heart failure with preserved ejection fraction ([facility] I50.32    Acute respiratory failure ([facility] J96.00         Stressors     Severity of stressors is moderate  Source of stressors include: Other psychosocial and environmental stressors     RECOMMENDATIONS:    Increase Gabapentin to 600mg TID. Patients home dose is 1200mg TID. OARRS verified. Last filled Gabapentin 1200mg TID on 06/07/2021. 180 tablets for a 30 day supply   Change Effexor from 50mg TID to 150mg XR in the morning  Add Atarax 50mg TID PRN for breakthrough anxiety  Do not recommend Precedex or benzodiazepines at this time  Continue with Lamictal, Abilify and Propanolol  Additional recommendations will follow the clinical course.         Thank you very much for allowing us to participate in the care of this patient. Jayant Carl is a 38 y.o. female being evaluated by a Virtual Visit (video visit) encounter to address concerns as mentioned above. A caregiver was present in the room along with the patient. Pursuant to the emergency declaration under the [facility] [address] authority and the [employer] Act, this Virtual Visit was conducted with patient's (and/or legal guardian's) consent, to reduce the patient's risk of exposure to COVID-19 and provide necessary medical care. Services were provided through a video synchronous discussion virtually to substitute for in-person visit by provider. Patient is present at [address] center, [place] and I am physically present at [place], [place]    --Baltazar Gupta MD on 6/14/2021 at 7:46 PM    An electronic signature was used to authenticate this note. **This report has been created using voice recognition software. It may contain minor errors which are inherent in voice recognition technology. **

## 2021-06-14 NOTE — PLAN OF CARE
Problem: Impaired respiratory status  Goal: Normal spontaneous ventilation  6/13/2021 0823 by Tabatha Posada RCP  Outcome: Ongoing     Problem: Urinary Elimination:  Goal: Signs and symptoms of infection will decrease  Description: Signs and symptoms of infection will decrease  Outcome: Ongoing  Note: Patient has a robb catheter for fluid management. Robb is patent and draining clear, yellow urine. Robb care completed this shift. Problem: Pain Control  Goal: Maintain pain level at or below patient's acceptable level (or 5 if patient is unable to determine acceptable level)  Outcome: Ongoing  Note: Pain Assessment: 0-10  Pain Level: 3 (tylenol given)   Patient's Stated Pain Goal: No pain   Is pain goal met at this time? No    Patient complained of a headache tonight. Problem: Neurological  Goal: Maximum potential motor/sensory/cognitive function  Outcome: Ongoing  Note: Patient alert to person and place. Nurse and sitter re-orienting the patient to her surroundings. Problem: Cardiovascular  Goal: No DVT, peripheral vascular complications  Outcome: Ongoing  Note: No signs or symptoms of DVT. Patient getting daily Lovenox injections. Goal: Hemodynamic stability  Outcome: Ongoing  Note: Vital signs have remained stable and within normal limits. Vitals being monitored every hour via the continuous bedside monitor. Heart rhythm is SB. Problem: Respiratory  Goal: No pulmonary complications  Outcome: Ongoing  Note: Patient on continuous HHFNC at 25L's & 30% with oxygen saturations above 92%. Continuous pulse ox in place. No SOB noted at rest or with exertion. Lungs are clear/diminished in the upper lobes and diminished in the bases. Goal: O2 Sat > 90%  Outcome: Ongoing  Note: Patient on continuous HHFNC at 25L's & 30% with oxygen saturations above 92%. Continuous pulse ox in place.   Goal: Supplemental O2 requirements decreased  Outcome: Ongoing  Note: Patient wears PRN oxygen at home- will

## 2021-06-14 NOTE — PLAN OF CARE
Problem: Musculor/Skeletal Functional Status  Goal: Absence of falls  Outcome: Met This Shift     Problem: Urinary Elimination:  Goal: Signs and symptoms of infection will decrease  Description: Signs and symptoms of infection will decrease  Outcome: Ongoing     Problem: Pain Control  Goal: Maintain pain level at or below patient's acceptable level (or 5 if patient is unable to determine acceptable level)  Outcome: Ongoing     Problem: Neurological  Goal: Maximum potential motor/sensory/cognitive function  Outcome: Ongoing     Problem: Cardiovascular  Goal: No DVT, peripheral vascular complications  Outcome: Ongoing     Problem: Cardiovascular  Goal: Hemodynamic stability  Outcome: Ongoing     Problem: Respiratory  Goal: No pulmonary complications  Outcome: Ongoing     Problem: Respiratory  Goal: O2 Sat > 90%  Outcome: Ongoing     Problem: Respiratory  Goal: Supplemental O2 requirements decreased  Outcome: Ongoing     Problem: GI  Goal: No bowel complications  Outcome: Ongoing     Problem: Nutrition  Goal: Optimal nutrition therapy  6/14/2021 1827 by Sepideh Wallace RN  Outcome: Ongoing     Problem: Skin Integrity/Risk  Goal: No skin breakdown during hospitalization  Outcome: Ongoing

## 2021-06-15 ENCOUNTER — APPOINTMENT (OUTPATIENT)
Dept: GENERAL RADIOLOGY | Age: 36
DRG: 139 | End: 2021-06-15
Attending: INTERNAL MEDICINE
Payer: MEDICAID

## 2021-06-15 VITALS
HEIGHT: 60 IN | HEART RATE: 79 BPM | BODY MASS INDEX: 32.85 KG/M2 | DIASTOLIC BLOOD PRESSURE: 69 MMHG | SYSTOLIC BLOOD PRESSURE: 139 MMHG | TEMPERATURE: 98 F | WEIGHT: 167.33 LBS | OXYGEN SATURATION: 100 % | RESPIRATION RATE: 24 BRPM

## 2021-06-15 LAB
ALBUMIN SERPL-MCNC: 3.6 G/DL (ref 3.5–5.1)
ALP BLD-CCNC: 88 U/L (ref 38–126)
ALT SERPL-CCNC: 9 U/L (ref 11–66)
ANION GAP SERPL CALCULATED.3IONS-SCNC: 11 MEQ/L (ref 8–16)
AST SERPL-CCNC: 14 U/L (ref 5–40)
BILIRUB SERPL-MCNC: 0.2 MG/DL (ref 0.3–1.2)
BUN BLDV-MCNC: 18 MG/DL (ref 7–22)
CALCIUM SERPL-MCNC: 9.5 MG/DL (ref 8.5–10.5)
CHLORIDE BLD-SCNC: 98 MEQ/L (ref 98–111)
CO2: 27 MEQ/L (ref 23–33)
CREAT SERPL-MCNC: 0.6 MG/DL (ref 0.4–1.2)
ERYTHROCYTE [DISTWIDTH] IN BLOOD BY AUTOMATED COUNT: 12 % (ref 11.5–14.5)
ERYTHROCYTE [DISTWIDTH] IN BLOOD BY AUTOMATED COUNT: 40.6 FL (ref 35–45)
GFR SERPL CREATININE-BSD FRML MDRD: > 90 ML/MIN/1.73M2
GLUCOSE BLD-MCNC: 77 MG/DL (ref 70–108)
GLUCOSE BLD-MCNC: 85 MG/DL (ref 70–108)
HCT VFR BLD CALC: 39.9 % (ref 37–47)
HEMOGLOBIN: 12.9 GM/DL (ref 12–16)
MCH RBC QN AUTO: 29.9 PG (ref 26–33)
MCHC RBC AUTO-ENTMCNC: 32.3 GM/DL (ref 32.2–35.5)
MCV RBC AUTO: 92.4 FL (ref 81–99)
PLATELET # BLD: 372 THOU/MM3 (ref 130–400)
PMV BLD AUTO: 10 FL (ref 9.4–12.4)
POTASSIUM REFLEX MAGNESIUM: 3.9 MEQ/L (ref 3.5–5.2)
RBC # BLD: 4.32 MILL/MM3 (ref 4.2–5.4)
SODIUM BLD-SCNC: 136 MEQ/L (ref 135–145)
TOTAL PROTEIN: 6.8 G/DL (ref 6.1–8)
WBC # BLD: 19.5 THOU/MM3 (ref 4.8–10.8)

## 2021-06-15 PROCEDURE — 6370000000 HC RX 637 (ALT 250 FOR IP): Performed by: INTERNAL MEDICINE

## 2021-06-15 PROCEDURE — 99239 HOSP IP/OBS DSCHRG MGMT >30: CPT | Performed by: INTERNAL MEDICINE

## 2021-06-15 PROCEDURE — C9113 INJ PANTOPRAZOLE SODIUM, VIA: HCPCS | Performed by: STUDENT IN AN ORGANIZED HEALTH CARE EDUCATION/TRAINING PROGRAM

## 2021-06-15 PROCEDURE — 85027 COMPLETE CBC AUTOMATED: CPT

## 2021-06-15 PROCEDURE — 6360000002 HC RX W HCPCS: Performed by: INTERNAL MEDICINE

## 2021-06-15 PROCEDURE — 71045 X-RAY EXAM CHEST 1 VIEW: CPT

## 2021-06-15 PROCEDURE — 82948 REAGENT STRIP/BLOOD GLUCOSE: CPT

## 2021-06-15 PROCEDURE — 36415 COLL VENOUS BLD VENIPUNCTURE: CPT

## 2021-06-15 PROCEDURE — 92526 ORAL FUNCTION THERAPY: CPT

## 2021-06-15 PROCEDURE — 6360000002 HC RX W HCPCS: Performed by: STUDENT IN AN ORGANIZED HEALTH CARE EDUCATION/TRAINING PROGRAM

## 2021-06-15 PROCEDURE — 6370000000 HC RX 637 (ALT 250 FOR IP): Performed by: STUDENT IN AN ORGANIZED HEALTH CARE EDUCATION/TRAINING PROGRAM

## 2021-06-15 PROCEDURE — 92610 EVALUATE SWALLOWING FUNCTION: CPT

## 2021-06-15 PROCEDURE — 80053 COMPREHEN METABOLIC PANEL: CPT

## 2021-06-15 RX ORDER — VENLAFAXINE HYDROCHLORIDE 150 MG/1
150 CAPSULE, EXTENDED RELEASE ORAL
Status: DISCONTINUED | OUTPATIENT
Start: 2021-06-15 | End: 2021-06-15 | Stop reason: HOSPADM

## 2021-06-15 RX ORDER — PREDNISONE 20 MG/1
40 TABLET ORAL DAILY
Qty: 6 TABLET | Refills: 0 | Status: SHIPPED | OUTPATIENT
Start: 2021-06-15 | End: 2021-06-18

## 2021-06-15 RX ORDER — VENLAFAXINE HYDROCHLORIDE 150 MG/1
150 CAPSULE, EXTENDED RELEASE ORAL
Qty: 30 CAPSULE | Refills: 3 | Status: SHIPPED | OUTPATIENT
Start: 2021-06-15

## 2021-06-15 RX ORDER — LAMOTRIGINE 25 MG/1
50 TABLET ORAL 2 TIMES DAILY
Qty: 120 TABLET | Refills: 0 | Status: SHIPPED | OUTPATIENT
Start: 2021-06-15 | End: 2021-07-15

## 2021-06-15 RX ORDER — GABAPENTIN 600 MG/1
600 TABLET ORAL 3 TIMES DAILY
Qty: 90 TABLET | Refills: 0 | Status: SHIPPED | OUTPATIENT
Start: 2021-06-15 | End: 2021-07-15

## 2021-06-15 RX ADMIN — HYDROXYZINE HYDROCHLORIDE 25 MG: 25 TABLET ORAL at 07:38

## 2021-06-15 RX ADMIN — PROPRANOLOL HYDROCHLORIDE 60 MG: 20 TABLET ORAL at 07:38

## 2021-06-15 RX ADMIN — VENLAFAXINE 50 MG: 50 TABLET ORAL at 07:38

## 2021-06-15 RX ADMIN — ENOXAPARIN SODIUM 40 MG: 40 INJECTION SUBCUTANEOUS at 07:57

## 2021-06-15 RX ADMIN — PANTOPRAZOLE SODIUM 40 MG: 40 INJECTION, POWDER, FOR SOLUTION INTRAVENOUS at 07:39

## 2021-06-15 RX ADMIN — SPIRONOLACTONE 25 MG: 25 TABLET ORAL at 07:38

## 2021-06-15 RX ADMIN — BUPRENORPHINE AND NALOXONE 1 FILM: 8; 2 FILM BUCCAL; SUBLINGUAL at 07:58

## 2021-06-15 RX ADMIN — FENTANYL CITRATE 50 MCG: 50 INJECTION, SOLUTION INTRAMUSCULAR; INTRAVENOUS at 02:54

## 2021-06-15 RX ADMIN — GABAPENTIN 600 MG: 600 TABLET, FILM COATED ORAL at 07:38

## 2021-06-15 RX ADMIN — ARIPIPRAZOLE 5 MG: 5 TABLET ORAL at 07:38

## 2021-06-15 RX ADMIN — HYDROXYZINE HYDROCHLORIDE 25 MG: 25 TABLET ORAL at 00:47

## 2021-06-15 RX ADMIN — ACETAMINOPHEN 650 MG: 325 TABLET ORAL at 07:38

## 2021-06-15 RX ADMIN — LAMOTRIGINE 50 MG: 25 TABLET ORAL at 07:37

## 2021-06-15 RX ADMIN — FENTANYL CITRATE 50 MCG: 50 INJECTION, SOLUTION INTRAMUSCULAR; INTRAVENOUS at 05:46

## 2021-06-15 RX ADMIN — METHYLPREDNISOLONE SODIUM SUCCINATE 40 MG: 40 INJECTION, POWDER, FOR SOLUTION INTRAMUSCULAR; INTRAVENOUS at 07:39

## 2021-06-15 RX ADMIN — DILTIAZEM HYDROCHLORIDE 30 MG: 30 TABLET, FILM COATED ORAL at 05:48

## 2021-06-15 ASSESSMENT — PAIN - FUNCTIONAL ASSESSMENT: PAIN_FUNCTIONAL_ASSESSMENT: ACTIVITIES ARE NOT PREVENTED

## 2021-06-15 ASSESSMENT — PAIN DESCRIPTION - PROGRESSION
CLINICAL_PROGRESSION: NOT CHANGED
CLINICAL_PROGRESSION: GRADUALLY WORSENING
CLINICAL_PROGRESSION: NOT CHANGED

## 2021-06-15 ASSESSMENT — PAIN DESCRIPTION - FREQUENCY
FREQUENCY: CONTINUOUS
FREQUENCY: CONTINUOUS

## 2021-06-15 ASSESSMENT — PAIN DESCRIPTION - PAIN TYPE
TYPE: ACUTE PAIN
TYPE: ACUTE PAIN

## 2021-06-15 ASSESSMENT — PAIN DESCRIPTION - ONSET
ONSET: ON-GOING
ONSET: ON-GOING

## 2021-06-15 ASSESSMENT — PAIN DESCRIPTION - DESCRIPTORS
DESCRIPTORS: ACHING;DISCOMFORT
DESCRIPTORS: ACHING;DISCOMFORT

## 2021-06-15 ASSESSMENT — PAIN SCALES - GENERAL
PAINLEVEL_OUTOF10: 7
PAINLEVEL_OUTOF10: 7
PAINLEVEL_OUTOF10: 8
PAINLEVEL_OUTOF10: 8
PAINLEVEL_OUTOF10: 0

## 2021-06-15 ASSESSMENT — PAIN DESCRIPTION - LOCATION
LOCATION: BACK
LOCATION: BACK

## 2021-06-15 NOTE — PLAN OF CARE
Problem: Urinary Elimination:  Goal: Signs and symptoms of infection will decrease  Description: Signs and symptoms of infection will decrease  6/15/2021 0348 by Tammy Nathan RN  Outcome: Ongoing  Note: Pt has no s/s of UTI  6/14/2021 1827 by Ronald Banegas RN  Outcome: Ongoing     Problem: Pain Control  Goal: Maintain pain level at or below patient's acceptable level (or 5 if patient is unable to determine acceptable level)  6/15/2021 0348 by Tammy Nathan RN  Outcome: Ongoing  Note: Pt c/o pain, prn meds given per order  6/14/2021 1827 by Ronald Banegas RN  Outcome: Ongoing     Problem: Neurological  Goal: Maximum potential motor/sensory/cognitive function  6/15/2021 0348 by Tammy Nathan RN  Outcome: Ongoing  Note: Pt neurologic function stable  6/14/2021 1827 by Ronald Banegas RN  Outcome: Ongoing     Problem: Cardiovascular  Goal: No DVT, peripheral vascular complications  5/82/1804 0348 by Tammy Nathan RN  Outcome: Ongoing  Note: Pt shows no s/s of  DVT  6/14/2021 1827 by Ronald Banegas RN  Outcome: Ongoing  Goal: Hemodynamic stability  6/15/2021 0348 by Tammy Nathan RN  Outcome: Ongoing  Note: Pt remains hemodynamically stable   6/14/2021 1827 by Ronald Banegas RN  Outcome: Ongoing     Problem: Respiratory  Goal: No pulmonary complications  5/78/6354 0348 by Tammy Nathan RN  Outcome: Ongoing  Note: Pt has no pulmonary complications   6/92/8903 1827 by Ronald Banegas RN  Outcome: Ongoing  Goal: O2 Sat > 90%  6/15/2021 0348 by Tammy Nathan RN  Outcome: Ongoing  Note: Pt has O2 sats .  90% on current O2 therapy   6/14/2021 1827 by Ronald Banegas RN  Outcome: Ongoing  Goal: Supplemental O2 requirements decreased  6/15/2021 0348 by Tammy Nathan RN  Outcome: Ongoing  Note: Pt on her baseline O2 therapy   6/14/2021 1827 by Ronald Banegas RN  Outcome: Ongoing     Problem: GI  Goal: No bowel complications  2/17/2026 0348 by Tammy Nathan RN  Outcome: Ongoing  Note: Pt has no bowel complications   5/04/9686 6813

## 2021-06-15 NOTE — PROGRESS NOTES
Discharge instructions provided to patient all questions answered. Patient sent home with all belongings.

## 2021-06-15 NOTE — CARE COORDINATION
6/15/21, 11:59 AM EDT    Patient goals/plan/ treatment preferences discussed by  and . Patient goals/plan/ treatment preferences reviewed with patient/ family. Patient/ family verbalize understanding of discharge plan and are in agreement with goal/plan/treatment preferences. Understanding was demonstrated using the teach back method. AVS provided by RN at time of discharge, which includes all necessary medical information pertaining to the patients current course of illness, treatment, post-discharge goals of care, and treatment preferences. Discharged home, denied needs. No new services ordered.

## 2021-06-15 NOTE — PROGRESS NOTES
[facility] [place] THERAPY  [facility] CCU 3A  Clinical Swallow Evaluation + Dysphagia Therapy      SLP Individual Minutes  Time In:   Time Out: 0845  Minutes: 16  Timed Code Treatment Minutes: 0 Minutes  Clinical swallow evaluation: 8 minutes  Dysphagia therapy: 8 minutes    Date: 6/15/2021  Patient Name: Taylor Christianson      CSN: [id number]   : 1985  (38 y.o.)  Gender: female   Referring Physician:  MARCE Vance CNP  Diagnosis: Acute respiratory failure  Secondary Diagnosis: Dysphagia    History of Present Illness/Injury: Pt admitted to [facility] with above med dx, please refer to physician H&P for full details. Pt with complicated medical course s/t admitting dx with intubation required on 21, self-extubation completed on 21. ST consulted to complete clinical swallow evaluation to ascertain need for potential dysphagia management given conveyed concerns for pneumonia. Past Medical History:   Diagnosis Date    Alcohol abuse     Anxiety     Asthma     Back pain     Cryptogenic organizing pneumonia ([address]     Depression     Drug abuse ([address]     Heart failure, diastolic, due to CAD ([address]     Hypertension     Irritable bowel syndrome     Pneumonia     Pseudotumor     Seizure ([address]        SUBJECTIVE:  Pt resting in bed upon arrival, live sitter at bedside. Pt endorses \"food sticking, like rice\" with visual placement approaching the sternal notch to indicate likelihood of globus sensation; indications conveyed for pertinent hx of hiatal hernia. Previously established diet level of regular solids+thin liquids in home environment with positive appetite. No family present. OBJECTIVE:    Pain:  No pain reported.     Current Diet: NPO    Respiratory Status:  Independent    Behavioral Observation:  Alert and Oriented    Oral Mechanism Evaluation:      Facial / Labial WFL    Lingual WFL    Dentition WFL    Velum WFL    Vocal Quality WFL    Sensation WFL    Cough Not intake, alternating solids/liquids), and s/s aspiration (immediate/delayed coughing, throat clearing, wet vocal quality) and potential implications (pneumonia, recurrent/prolonged hospitalizations, medical decline). Further education provided re: needs to engage in comprehensive oral care TID (post meals with staff assistance) in attempts to reduce potential bacteria colonization and to maximize oral integrity as well as to engage in ambulation with staffing (as able) to assist with mobility and overall pulmonary health. Verbal receptiveness noted. RECOMMENDATIONS/ASSESSMENT:  Instrumental Evaluation: Instrumental evaluation not indicated at this time. Diet Recommendations:  Regular, thin liquids  Strategies:  Full Upright Position, Small Bite/Sip, Pulmonary Monitoring, Medications Whole with Thin and Alternate Solids and Liquids   Rehabilitation Potential: excellent    EDUCATION:  Learner: Patient  Education:  Reviewed results and recommendations of this evaluation, Reviewed diet and strategies, Reviewed signs, symptoms and risks of aspiration, Reviewed ST goals and Plan of Care and Reviewed recommendations for follow-up  Evaluation of Education: Verbalizes understanding, Demonstrates without assistance and Family not present    PLAN:  Skilled SLP intervention on acute care 3-5 x per week or until goals met and/or pt plateaus in function. Specific interventions for next session may include: dysphagia management. PATIENT GOAL:    Did not state. Will further assess during treatment. SHORT TERM GOALS:  Short-term Goals  Timeframe for Short-term Goals: 2 weeks  Goal 1: Pt will safely consume regular diet + thin liquids without overt s/s aspiration and with direct implementation of identified compensatory strategies to assist with nutrition/hydration measures.   Goal 2: Monitor for c/o globus sensation (pertinent hx for hiatal hernia); consider completion of formal instrumentation via MBS should it become clinically indicated.     LONG TERM GOALS:  No LTG established given short JESICAOS      Jonathan Peters M.A., [facility]

## 2021-06-15 NOTE — DISCHARGE SUMMARY
Hospital Medicine Discharge Summary      Patient Identification:   Ronaldo Hernandez   : 1985  MRN: [id number]   Account: [de-identified]      Patient's PCP: Mercedez Noble MD    Admit Date: 2021     Discharge Date:   06/15/21    Admitting Physician: No admitting provider for patient encounter. Discharge Physician: Rahat Merino MD     Discharge Diagnoses: Acute on chronic hypoxic respiratory failure (resolved); Hx of Cryptogenic organizing pneumonia; Drug Abuse; CARLOS ALBERTO/MDD    Active Hospital Problems    Diagnosis Date Noted    Acute respiratory failure ([facility] [address] [J96.00] 2021       The patient was seen and examined on day of discharge and this discharge summary is in conjunction with any daily progress note from day of discharge. Hospital Course:   Ronaldo Hernandez is a 38 y.o. female admitted to [facility] on 2021 for sob. PMH of chronic respiratory failure with 1-2 home O2 use, drug/alcohol abuse, anxiety, IBS, depression, idiopathic intracranial HTN, diastolic heart failure, wedge biopsy in 2018, and cryptogenic organizing pneumonia. Patient presented to [facility] on 21 from outlying facility with complaints of worsening dyspnea. Of note, it was reported that at home she has continued to chain smoke despite being advised otherwise by her pulmonologist. She endorsed hx of intravenous drug abuse but quit 13 years ago. Per OSH report, she has poor medication/medical compliance. Per report, she was noted to have a leukocytosis, along with ground glass opacities on imaging on 21. Patient was administered ceftriaxone and steroids with minimal improvement. She was requiring supplemental oxygen. Patient left AMA from [facility] and transferred to [facility]. The patient stayed two days there without clinical improvement and subsequently transferred to [facility] for further care and management.     Upon arrival patient was admitted by hospital service.  She arrived on HF NC support 40 lpm 100% FiO2. She transitioned to BiPAP. The patient was transferred to the ICU further worsening work of breathing and increased respiratory distress with hypoxia. The patient was intubated, sedated, and mechanically ventilated. Bronchoscopy was done without any reported sign of pneumonia. BAL was negative. Steroid therapy was continued. Patient did self-extubate on 6/13/21. She was placed on BiPAP and did well with that with precedex infusing. She was weaned since then to room air. Pt discharged home with close PCP and Psychiatry follow-up. Assessment and Plan:   1. Acute on chronic hypoxic respiratory failure: Chronic 1-2 L home O2 use. Presented in worsening respiratory failure ultimately requiring intubation 6/11. Subsequently self-extubated 6/13. Initially was maintained on BiPAP and subsequently weaned to room air. Stable for transfer to the floor. Previously diagnosed with cryptogenic organizing pneumonia/bronchiolitis obliterans with organizing pneumonia based upon biopsy at [facility]. Was also diagnosed with pulmonary HTN at [facility] but no RHC available to review. CT with diffuse bilateral ground-glass, which is apparently acute from comparison report of May 2021 CT which reported haziness but no ground-glass. Unable to personally view this exam. Infectious workup has been negative to date, patient afebrile; discontinuing antibiotics today 6/14. Monitor off therapy. Saw rheumatology in 2/2020 at [facility] with elevated rheumatoid factor but no overt evidence for rheumatoid arthritis and anti-CCP was normal. RF remains mildly elevated now, awaiting further workup results for this. Scleroderma SCL-70 was negative arguing against scleroderma-related ILD. HIV Ab/Ag was nonreactive. COVID-19 screen was negative. Given positive UDS resulted as \"presumed\" positive for PCP, amphetamines, opiates, benzodiazepines, concern is for relapsed drug use, and possible drug inhalation pneumonitis.  However patient is adamant that she has remained sober the last 13 years. Weaned to RA. 2. Leukocytosis: WBC 33.0 on admission, improved to 16.1. Infectious work-up including blood cultures, respiratory culture, fungus culture, respiratory viral culture, urine culture all negative to date. Patient is maintained afebrile. Pro-Sidney was negative on admission. CT with diffuse bilateral groundglass opacities, and chest x-ray with bilateral diffuse infiltrates. More likely a noninfectious process. Stopping antibiotics as above. 3. 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 S/p VATS with wedge biopsy in 2018. 4. Anxiety: Pt endorses anxiety, irritability. Stop precedex drip per psych rec's. Also do not recommend benzodiazepines. Psych consulted for assistance. Meds were adjusted: gabapentin increased, added atarax as needed for breakthrough anxiety. Continued Effexor but changed from 50mg TID to 150mg XR in the morning. Lamictal, abilify and propranolol were continued. Psych following. 5. Drug abuse: Urine drug screen on admission reported presented positive for benzodiazepines, opiates, amphetamines, PCP. Patient does have a noted history of drug abuse, and had reportedly been sober for 13 years. 6. Chronic diastolic HFpEF: ECHO with EF 50-55% on 6/10/21with moderate TR. Appears euvolemic. Strict I/O, daily weights. Net even since admission. Net-14 lbs since admission. 7. Pulmonary HTN, history of: ECHO 4/2019 estimated RVSP=53mmHg.  Did not see right heart cath done. Acute ground-glass opacities argue that this is not the cause of her acute resp failure. 8. Idiopathic intracranial HTN: LP on 6/9/21 with opening pressure=26mm. 10mL CSF removed for therapeutic relief. Diltiazem, propanolol with holding parameters.   9. Seizure disorder, history of: EEG 6/9/21 reported as with 7 to 8Hz waveforms with mild generalized slowing without epileptiform features. Continue lamictal.  10. Tobacco abuse, history of: chronic. 1 ppd use. Continues to smoke. Nicotine patch continued.            Exam:     Vitals:  Vitals:    06/15/21 0344 06/15/21 0801 06/15/21 0900 06/15/21 0905   BP: 139/69      Pulse: 74      Resp: 16      Temp: 98.5 °F (36.9 °C) 98 °F (36.7 °C)     TempSrc: Oral Oral     SpO2: 97%  98% 90%   Weight:       Height:         Weight: Weight: 167 lb 5.3 oz (75.9 kg)     24 hour intake/output:    Intake/Output Summary (Last 24 hours) at 6/15/2021 1028  Last data filed at 6/15/2021 0951  Gross per 24 hour   Intake 775.75 ml   Output 2850 ml   Net -2074.25 ml         Labs: For convenience and continuity at follow-up the following most recent labs are provided:      CBC:    Lab Results   Component Value Date    WBC 19.5 06/15/2021    HGB 12.9 06/15/2021    HCT 39.9 06/15/2021     06/15/2021       Renal:    Lab Results   Component Value Date     06/15/2021    K 3.9 06/15/2021    CL 98 06/15/2021    CO2 27 06/15/2021    BUN 18 06/15/2021    CREATININE 0.6 06/15/2021    CREATININE 0.71 06/11/2021    CALCIUM 9.5 06/15/2021    PHOS 3.3 06/11/2021         Significant Diagnostic Studies    Radiology:   XR CHEST PORTABLE   Final Result   No consolidation or effusion. This document has been electronically signed by: Matthieu Bass MD on 06/15/2021 04:34 AM      XR CHEST PORTABLE   Final Result   1. The tip of the endotracheal tube is an 11 mm above the juni. 2. There is extensive bilateral pulmonary opacification. 3. There is borderline cardiomegaly. 4. There is no change in enteric tube. **This report has been created using voice recognition software. It may contain minor errors which are inherent in voice recognition technology. **      Final report electronically signed by DR Petra Callahan on 6/12/2021 7:42 AM      XR CHEST PORTABLE   Final Result   1. There is an endotracheal tube present overlying the lower trachea with the tip projecting 4 mm above the juni directed towards the right mainstem bronchus. The endotracheal tube was in need of readjustment. However at the time of interpretation of    this examination, the endotracheal tube had been readjusted. The post ET tube adjustment chest x-ray is reported separately and was performed on 6/12/2021 at 7:04 AM. Please refer to that report for further details. 2. Patchy airspace opacities are seen throughout the lungs bilaterally. This is most pronounced at the left greater than right lung bases. These findings were discussed with the nurse caring for the patient at the time of dictation, Loyda Coles RN, on 6/12/2021 at 9:30 PM. Of note a subsequent chest x-ray dated 6/12/2021 times 7:04 AM was performed and was already reported. This chest x-ray    was performed following ET tube adjustment. Please refer to that report for further details. **This report has been created using voice recognition software. It may contain minor errors which are inherent in voice recognition technology. **      Final report electronically signed by Dr. Maria E Hare on 6/12/2021 9:40 PM      CT INTERPRETATION OF OUTSIDE IMAGES   Final Result   Impression:      Diffuse bilateral groundglass consolidation, possibly pneumonia. Mildly enlarged mediastinal nodes, likely reactive. This document has been electronically signed by: Angelica Ambrocio MD on    06/12/2021 12:00 AM      All CTs at this facility use dose modulation techniques and iterative    reconstructions, and/or weight-based dosing   when appropriate to reduce radiation to a low as reasonably achievable. XR CHEST PORTABLE   Final Result   Impression:      Bilateral consolidation, question pulmonary edema versus pneumonia.       This document has been electronically signed by: Angelica Ambrocio MD on    06/11/2021 10:49 PM             Consults: IP CONSULT TO PHARMACY  IP CONSULT TO DIETITIAN  IP CONSULT TO PSYCHIATRY    Disposition:    [x] Home       [] TCU       [] Rehab       [] Psych       [] SNF       [] [place]       [] Other-    Condition at Discharge: Stable    Code Status:  Full Code     Patient Instructions: Activity: activity as tolerated  Diet: ADULT DIET; Regular      Follow-up visits:   Viridiana Trujillo MD  [address] [facility]  [place] Steve [phone]    Schedule an appointment as soon as possible for a visit           Discharge Medications:      Kimberly Corado   [place] Medication Instructions ZPV:[id number]    Printed on:06/15/21 1035   Medication Information                      albuterol sulfate  (90 Base) MCG/ACT inhaler  Inhale 1 puff into the lungs every 6 hours as needed             ARIPiprazole (ABILIFY) 5 MG tablet  Take 5 mg by mouth daily             buprenorphine-naloxone (SUBOXONE) 8-2 MG FILM SL film  Place 1 each under the tongue 2 times daily. dilTIAZem (CARDIZEM CD) 120 MG extended release capsule  Take 1 capsule by mouth daily             furosemide (LASIX) 20 MG tablet  Take 20 mg by mouth daily             gabapentin (NEURONTIN) 600 MG tablet  Take 1 tablet by mouth 3 times daily for 30 days.              hydrOXYzine (VISTARIL) 25 MG capsule  Take 25 mg by mouth 3 times daily as needed for Itching             lamoTRIgine (LAMICTAL) 25 MG tablet  Take 2 tablets by mouth 2 times daily             meclizine (ANTIVERT) 12.5 MG tablet  Take 12.5 mg by mouth daily as needed              pantoprazole (PROTONIX) 40 MG tablet  Take 40 mg by mouth daily              predniSONE (DELTASONE) 20 MG tablet  Take 2 tablets by mouth daily for 3 days             propranolol (INDERAL) 60 MG tablet  Take 1 tablet by mouth 3 times daily             spironolactone (ALDACTONE) 25 MG tablet  1 tablet daily             valsartan (DIOVAN) 320 MG tablet  Take 1 tablet by mouth daily venlafaxine (EFFEXOR XR) 150 MG extended release capsule  Take 1 capsule by mouth daily (with breakfast)                 Time Spent on discharge is more than 30 minutes in the examination, evaluation, counseling and review of medications and discharge plan. Signed: Thank you Bhavana Menjivar MD for the opportunity to be involved in this patient's care.     Electronically signed by Telly Jacobson MD on 6/15/2021 at 10:28 AM

## 2021-06-16 LAB
ANA SCREEN: NORMAL
ANTI SSA: NORMAL
ANTI SSB: 0 AU/ML (ref 0–40)
DSDNA ANTIBODY: NORMAL
NEUTROPHIL CYTOPLASMIC AB IGG: NORMAL

## 2021-06-19 LAB — VIRAL CULTURE,RAPID,RESPIRATOR: NORMAL

## 2021-06-21 LAB
FUNGUS IDENTIFIED: ABNORMAL
FUNGUS SMEAR: ABNORMAL
ORGANISM: ABNORMAL

## 2021-06-23 LAB — LEGIONELLA SPECIES CULTURE: NORMAL
